# Patient Record
Sex: FEMALE | Race: WHITE | NOT HISPANIC OR LATINO | Employment: OTHER | URBAN - METROPOLITAN AREA
[De-identification: names, ages, dates, MRNs, and addresses within clinical notes are randomized per-mention and may not be internally consistent; named-entity substitution may affect disease eponyms.]

---

## 2018-10-25 ENCOUNTER — HOSPITAL ENCOUNTER (INPATIENT)
Facility: HOSPITAL | Age: 68
LOS: 1 days | Discharge: HOME | DRG: 386 | End: 2018-10-28
Attending: EMERGENCY MEDICINE | Admitting: FAMILY MEDICINE
Payer: MEDICARE

## 2018-10-25 ENCOUNTER — APPOINTMENT (EMERGENCY)
Dept: RADIOLOGY | Facility: HOSPITAL | Age: 68
DRG: 386 | End: 2018-10-25
Payer: MEDICARE

## 2018-10-25 DIAGNOSIS — K50.10: ICD-10-CM

## 2018-10-25 DIAGNOSIS — K52.9 COLITIS: Primary | ICD-10-CM

## 2018-10-25 PROBLEM — K57.92 DIVERTICULITIS: Status: ACTIVE | Noted: 2018-10-25

## 2018-10-25 LAB
ALBUMIN SERPL BCP-MCNC: 3.4 G/DL (ref 3.5–5)
ALP SERPL-CCNC: 70 U/L (ref 46–116)
ALT SERPL W P-5'-P-CCNC: 35 U/L (ref 12–78)
ANION GAP SERPL CALCULATED.3IONS-SCNC: 8 MMOL/L (ref 4–13)
AST SERPL W P-5'-P-CCNC: 19 U/L (ref 5–45)
BACTERIA UR QL AUTO: ABNORMAL /HPF
BASOPHILS # BLD AUTO: 0.03 THOUSANDS/ΜL (ref 0–0.1)
BASOPHILS NFR BLD AUTO: 0 % (ref 0–1)
BILIRUB DIRECT SERPL-MCNC: 0.2 MG/DL (ref 0–0.2)
BILIRUB SERPL-MCNC: 0.5 MG/DL (ref 0.2–1)
BILIRUB UR QL STRIP: NEGATIVE
BUN SERPL-MCNC: 14 MG/DL (ref 5–25)
CALCIUM SERPL-MCNC: 9.5 MG/DL (ref 8.3–10.1)
CHLORIDE SERPL-SCNC: 98 MMOL/L (ref 100–108)
CLARITY UR: CLEAR
CO2 SERPL-SCNC: 29 MMOL/L (ref 21–32)
COLOR UR: YELLOW
CREAT SERPL-MCNC: 0.81 MG/DL (ref 0.6–1.3)
EOSINOPHIL # BLD AUTO: 0.08 THOUSAND/ΜL (ref 0–0.61)
EOSINOPHIL NFR BLD AUTO: 1 % (ref 0–6)
ERYTHROCYTE [DISTWIDTH] IN BLOOD BY AUTOMATED COUNT: 14.6 % (ref 11.6–15.1)
GFR SERPL CREATININE-BSD FRML MDRD: 76 ML/MIN/1.73SQ M
GLUCOSE SERPL-MCNC: 103 MG/DL (ref 65–140)
GLUCOSE UR STRIP-MCNC: NEGATIVE MG/DL
HCT VFR BLD AUTO: 42.2 % (ref 34.8–46.1)
HGB BLD-MCNC: 13.7 G/DL (ref 11.5–15.4)
HGB UR QL STRIP.AUTO: NEGATIVE
IMM GRANULOCYTES # BLD AUTO: 0.08 THOUSAND/UL (ref 0–0.2)
IMM GRANULOCYTES NFR BLD AUTO: 1 % (ref 0–2)
KETONES UR STRIP-MCNC: NEGATIVE MG/DL
LEUKOCYTE ESTERASE UR QL STRIP: NEGATIVE
LIPASE SERPL-CCNC: 146 U/L (ref 73–393)
LYMPHOCYTES # BLD AUTO: 1.38 THOUSANDS/ΜL (ref 0.6–4.47)
LYMPHOCYTES NFR BLD AUTO: 10 % (ref 14–44)
MAGNESIUM SERPL-MCNC: 1.9 MG/DL (ref 1.6–2.6)
MCH RBC QN AUTO: 28.7 PG (ref 26.8–34.3)
MCHC RBC AUTO-ENTMCNC: 32.5 G/DL (ref 31.4–37.4)
MCV RBC AUTO: 89 FL (ref 82–98)
MONOCYTES # BLD AUTO: 0.86 THOUSAND/ΜL (ref 0.17–1.22)
MONOCYTES NFR BLD AUTO: 6 % (ref 4–12)
MUCOUS THREADS UR QL AUTO: ABNORMAL
NEUTROPHILS # BLD AUTO: 11.87 THOUSANDS/ΜL (ref 1.85–7.62)
NEUTS SEG NFR BLD AUTO: 82 % (ref 43–75)
NITRITE UR QL STRIP: POSITIVE
NON-SQ EPI CELLS URNS QL MICRO: ABNORMAL /HPF
NRBC BLD AUTO-RTO: 0 /100 WBCS
PH UR STRIP.AUTO: 6 [PH] (ref 5–9)
PLATELET # BLD AUTO: 277 THOUSANDS/UL (ref 149–390)
PMV BLD AUTO: 8.2 FL (ref 8.9–12.7)
POTASSIUM SERPL-SCNC: 3.5 MMOL/L (ref 3.5–5.3)
PROT SERPL-MCNC: 7.3 G/DL (ref 6.4–8.2)
PROT UR STRIP-MCNC: NEGATIVE MG/DL
RBC # BLD AUTO: 4.77 MILLION/UL (ref 3.81–5.12)
RBC #/AREA URNS AUTO: ABNORMAL /HPF
SODIUM SERPL-SCNC: 135 MMOL/L (ref 136–145)
SP GR UR STRIP.AUTO: <=1.005 (ref 1–1.03)
UROBILINOGEN UR QL STRIP.AUTO: 0.2 E.U./DL
WBC # BLD AUTO: 14.3 THOUSAND/UL (ref 4.31–10.16)
WBC #/AREA URNS AUTO: ABNORMAL /HPF

## 2018-10-25 PROCEDURE — 74177 CT ABD & PELVIS W/CONTRAST: CPT

## 2018-10-25 PROCEDURE — 87081 CULTURE SCREEN ONLY: CPT | Performed by: FAMILY MEDICINE

## 2018-10-25 PROCEDURE — 80076 HEPATIC FUNCTION PANEL: CPT | Performed by: EMERGENCY MEDICINE

## 2018-10-25 PROCEDURE — 36415 COLL VENOUS BLD VENIPUNCTURE: CPT | Performed by: EMERGENCY MEDICINE

## 2018-10-25 PROCEDURE — 83690 ASSAY OF LIPASE: CPT | Performed by: EMERGENCY MEDICINE

## 2018-10-25 PROCEDURE — 96375 TX/PRO/DX INJ NEW DRUG ADDON: CPT

## 2018-10-25 PROCEDURE — 96374 THER/PROPH/DIAG INJ IV PUSH: CPT

## 2018-10-25 PROCEDURE — 96361 HYDRATE IV INFUSION ADD-ON: CPT

## 2018-10-25 PROCEDURE — 99285 EMERGENCY DEPT VISIT HI MDM: CPT

## 2018-10-25 PROCEDURE — 85025 COMPLETE CBC W/AUTO DIFF WBC: CPT | Performed by: EMERGENCY MEDICINE

## 2018-10-25 PROCEDURE — 80048 BASIC METABOLIC PNL TOTAL CA: CPT | Performed by: EMERGENCY MEDICINE

## 2018-10-25 PROCEDURE — 81001 URINALYSIS AUTO W/SCOPE: CPT | Performed by: EMERGENCY MEDICINE

## 2018-10-25 PROCEDURE — 83735 ASSAY OF MAGNESIUM: CPT | Performed by: EMERGENCY MEDICINE

## 2018-10-25 PROCEDURE — 99219 PR INITIAL OBSERVATION CARE/DAY 50 MINUTES: CPT | Performed by: NURSE PRACTITIONER

## 2018-10-25 RX ORDER — HYDROMORPHONE HCL/PF 1 MG/ML
1 SYRINGE (ML) INJECTION ONCE
Status: COMPLETED | OUTPATIENT
Start: 2018-10-25 | End: 2018-10-25

## 2018-10-25 RX ORDER — METRONIDAZOLE 500 MG/1
500 TABLET ORAL EVERY 8 HOURS SCHEDULED
Qty: 30 TABLET | Refills: 0 | Status: SHIPPED | OUTPATIENT
Start: 2018-10-25 | End: 2018-10-28

## 2018-10-25 RX ORDER — CIPROFLOXACIN 500 MG/1
500 TABLET, FILM COATED ORAL 2 TIMES DAILY
Qty: 20 TABLET | Refills: 0 | Status: SHIPPED | OUTPATIENT
Start: 2018-10-25 | End: 2018-10-28 | Stop reason: HOSPADM

## 2018-10-25 RX ORDER — ONDANSETRON 2 MG/ML
4 INJECTION INTRAMUSCULAR; INTRAVENOUS ONCE
Status: DISCONTINUED | OUTPATIENT
Start: 2018-10-25 | End: 2018-10-25

## 2018-10-25 RX ORDER — ALENDRONATE SODIUM 5 MG
5 TABLET ORAL
COMMUNITY

## 2018-10-25 RX ORDER — BISOPROLOL FUMARATE 5 MG/1
10 TABLET ORAL DAILY
Status: DISCONTINUED | OUTPATIENT
Start: 2018-10-26 | End: 2018-10-28 | Stop reason: HOSPADM

## 2018-10-25 RX ORDER — BISOPROLOL FUMARATE AND HYDROCHLOROTHIAZIDE 10; 6.25 MG/1; MG/1
2 TABLET ORAL DAILY
COMMUNITY
End: 2018-10-25

## 2018-10-25 RX ORDER — LEVOFLOXACIN 5 MG/ML
750 INJECTION, SOLUTION INTRAVENOUS EVERY 24 HOURS
Status: DISCONTINUED | OUTPATIENT
Start: 2018-10-26 | End: 2018-10-28 | Stop reason: HOSPADM

## 2018-10-25 RX ORDER — ONDANSETRON 2 MG/ML
4 INJECTION INTRAMUSCULAR; INTRAVENOUS ONCE
Status: COMPLETED | OUTPATIENT
Start: 2018-10-25 | End: 2018-10-25

## 2018-10-25 RX ORDER — MORPHINE SULFATE 4 MG/ML
4 INJECTION, SOLUTION INTRAMUSCULAR; INTRAVENOUS ONCE
Status: COMPLETED | OUTPATIENT
Start: 2018-10-25 | End: 2018-10-25

## 2018-10-25 RX ORDER — BISOPROLOL FUMARATE AND HYDROCHLOROTHIAZIDE 5; 6.25 MG/1; MG/1
2 TABLET ORAL DAILY
COMMUNITY

## 2018-10-25 RX ORDER — LANOLIN ALCOHOL/MO/W.PET/CERES
3 CREAM (GRAM) TOPICAL
Status: DISCONTINUED | OUTPATIENT
Start: 2018-10-25 | End: 2018-10-28 | Stop reason: HOSPADM

## 2018-10-25 RX ORDER — CIPROFLOXACIN 500 MG/1
500 TABLET, FILM COATED ORAL ONCE
Status: COMPLETED | OUTPATIENT
Start: 2018-10-25 | End: 2018-10-25

## 2018-10-25 RX ORDER — SODIUM CHLORIDE 9 MG/ML
75 INJECTION, SOLUTION INTRAVENOUS CONTINUOUS
Status: DISCONTINUED | OUTPATIENT
Start: 2018-10-25 | End: 2018-10-28 | Stop reason: HOSPADM

## 2018-10-25 RX ORDER — ONDANSETRON 2 MG/ML
INJECTION INTRAMUSCULAR; INTRAVENOUS
Status: DISPENSED
Start: 2018-10-25 | End: 2018-10-26

## 2018-10-25 RX ORDER — METRONIDAZOLE 500 MG/1
500 TABLET ORAL ONCE
Status: COMPLETED | OUTPATIENT
Start: 2018-10-25 | End: 2018-10-25

## 2018-10-25 RX ADMIN — ONDANSETRON 4 MG: 2 INJECTION INTRAMUSCULAR; INTRAVENOUS at 19:59

## 2018-10-25 RX ADMIN — MORPHINE SULFATE 4 MG: 4 INJECTION INTRAVENOUS at 17:06

## 2018-10-25 RX ADMIN — IOHEXOL 100 ML: 350 INJECTION, SOLUTION INTRAVENOUS at 17:45

## 2018-10-25 RX ADMIN — SODIUM CHLORIDE 1000 ML: 0.9 INJECTION, SOLUTION INTRAVENOUS at 16:41

## 2018-10-25 RX ADMIN — MELATONIN TAB 3 MG 3 MG: 3 TAB at 21:29

## 2018-10-25 RX ADMIN — CIPROFLOXACIN HYDROCHLORIDE 500 MG: 500 TABLET, FILM COATED ORAL at 18:51

## 2018-10-25 RX ADMIN — SODIUM CHLORIDE 75 ML/HR: 0.9 INJECTION, SOLUTION INTRAVENOUS at 21:18

## 2018-10-25 RX ADMIN — METRONIDAZOLE 500 MG: 500 TABLET ORAL at 18:51

## 2018-10-25 RX ADMIN — HYDROMORPHONE HYDROCHLORIDE 1 MG: 1 INJECTION, SOLUTION INTRAMUSCULAR; INTRAVENOUS; SUBCUTANEOUS at 18:52

## 2018-10-25 NOTE — ED NOTES
Pt returned from CT  Pt offers no complaints  No distress noted  Pt resting comfortably  [facility] at bedside       Callie Naik RN  10/25/18 5773

## 2018-10-25 NOTE — ED PROVIDER NOTES
History  Chief Complaint   Patient presents with    Abdominal Pain     c/o abd pain since last night with nausea and chills     80 y/o female presents with lower abdominal pain that started a day ago getting progressively worse  Denies nausea,vomiting,diarrhea,constipation,fevers  admits to chills  Denies dysuria, urgency or frequency  History of diverticulitis  Reports the pain is sharp continuous currently 8/10 and nothing makes it better or worse  History provided by:  Patient   used: No        Prior to Admission Medications   Prescriptions Last Dose Informant Patient Reported? Taking? Multiple Vitamins-Minerals (MULTIVITAMIN ADULT PO) 10/24/2018 at Unknown time  Yes Yes   Sig: Take by mouth daily   alendronate (FOSAMAX) 5 mg tablet 10/24/2018 at Unknown time  Yes Yes   Sig: Take 5 mg by mouth weekly before breakfast   bisoprolol-hydrochlorothiazide (ZIAC) 5-6 25 MG per tablet 10/25/2018 at Unknown time  Yes Yes   Sig: Take 2 tablets by mouth daily      Facility-Administered Medications: None       Past Medical History:   Diagnosis Date    Diverticulitis     Hypertension     Osteoporosis        Past Surgical History:   Procedure Laterality Date    TONSILLECTOMY         Family History   Problem Relation Age of Onset    Heart attack Mother     Heart failure Mother     Stroke Father     Hypertension Father     Alzheimer's disease Father     Osteoporosis Sister     Heart attack Brother     Arthritis Brother     Aortic aneurysm Brother      I have reviewed and agree with the history as documented  Social History   Substance Use Topics    Smoking status: Never Smoker    Smokeless tobacco: Never Used    Alcohol use Yes      Comment: one glass of wine most nights        Review of Systems   All other systems reviewed and are negative  Physical Exam  Physical Exam   Constitutional: She is oriented to person, place, and time   She appears well-developed and well-nourished  HENT:   Head: Normocephalic and atraumatic  Eyes: Pupils are equal, round, and reactive to light  EOM are normal    Neck: Normal range of motion  Neck supple  Cardiovascular: Normal rate and regular rhythm  Pulmonary/Chest: Effort normal and breath sounds normal    Abdominal: Soft  Bowel sounds are normal  She exhibits no distension and no mass  There is tenderness  There is no rebound and no guarding  No hernia  Musculoskeletal: Normal range of motion  Neurological: She is alert and oriented to person, place, and time  Skin: Skin is warm and dry  Psychiatric: She has a normal mood and affect  Nursing note and vitals reviewed        Vital Signs  ED Triage Vitals [10/25/18 1603]   Temperature Pulse Respirations Blood Pressure SpO2   98 7 °F (37 1 °C) 68 16 128/60 99 %      Temp Source Heart Rate Source Patient Position - Orthostatic VS BP Location FiO2 (%)   Tympanic Monitor Sitting Right arm --      Pain Score       9           Vitals:    10/25/18 1603 10/25/18 1811 10/25/18 2024 10/25/18 2125   BP: 128/60 134/61 129/60 143/65   Pulse: 68 84 88 85   Patient Position - Orthostatic VS: Sitting Lying Lying Lying       Visual Acuity      ED Medications  Medications   multivitamin-minerals (CENTRUM) tablet 1 tablet (not administered)   bisoprolol (ZEBETA) tablet 10 mg (not administered)   sodium chloride 0 9 % infusion (75 mL/hr Intravenous New Bag 10/25/18 2118)   levofloxacin (LEVAQUIN) IVPB (premix) 750 mg (not administered)     And   metroNIDAZOLE (FLAGYL) IVPB (premix) 500 mg (not administered)   enoxaparin (LOVENOX) subcutaneous injection 40 mg (not administered)   melatonin tablet 3 mg (3 mg Oral Given 10/25/18 2129)   sodium chloride 0 9 % bolus 1,000 mL (0 mL Intravenous Stopped 10/25/18 1854)   morphine (PF) 4 mg/mL injection 4 mg (4 mg Intravenous Given 10/25/18 1706)   iohexol (OMNIPAQUE) 350 MG/ML injection (MULTI-DOSE) 100 mL (100 mL Intravenous Given 10/25/18 1745) ciprofloxacin (CIPRO) tablet 500 mg (500 mg Oral Given 10/25/18 1851)   metroNIDAZOLE (FLAGYL) tablet 500 mg (500 mg Oral Given 10/25/18 1851)   HYDROmorphone (DILAUDID) injection 1 mg (1 mg Intravenous Given 10/25/18 1852)   ondansetron (ZOFRAN) injection 4 mg (4 mg Intravenous Given 10/25/18 1959)       Diagnostic Studies  Results Reviewed     Procedure Component Value Units Date/Time    Urine Microscopic [[id number]]  (Abnormal) Collected:  10/25/18 1825    Lab Status:  Final result Specimen:  Urine from Urine, Clean Catch Updated:  10/25/18 1845     RBC, UA None Seen /hpf      WBC, UA None Seen /hpf      Epithelial Cells Occasional /hpf      Bacteria, UA Occasional /hpf      MUCOUS THREADS Occasional (A)    UA w Reflex to Microscopic [[id number]]  (Abnormal) Collected:  10/25/18 1825    Lab Status:  Final result Specimen:  Urine from Urine, Clean Catch Updated:  10/25/18 1834     Color, UA Yellow     Clarity, UA Clear     Specific Gravity, UA <=1 005     pH, UA 6 0     Leukocytes, UA Negative     Nitrite, UA Positive (A)     Protein, UA Negative mg/dl      Glucose, UA Negative mg/dl      Ketones, UA Negative mg/dl      Urobilinogen, UA 0 2 E U /dl      Bilirubin, UA Negative     Blood, UA Negative    Basic metabolic panel [[id number]]  (Abnormal) Collected:  10/25/18 1641    Lab Status:  Final result Specimen:  Blood from Arm, Left Updated:  10/25/18 1701     Sodium 135 (L) mmol/L      Potassium 3 5 mmol/L      Chloride 98 (L) mmol/L      CO2 29 mmol/L      ANION GAP 8 mmol/L      BUN 14 mg/dL      Creatinine 0 81 mg/dL      Glucose 103 mg/dL      Calcium 9 5 mg/dL      eGFR 76 ml/min/1 73sq m     Narrative:         National Kidney Disease Education Program recommendations are as follows:  GFR calculation is accurate only with a steady state creatinine  Chronic Kidney disease less than 60 ml/min/1 73 sq  meters  Kidney failure less than 15 ml/min/1 73 sq  meters      Hepatic function panel [[id number]]  (Abnormal) Collected:  10/25/18 1641    Lab Status:  Final result Specimen:  Blood from Arm, Left Updated:  10/25/18 1701     Total Bilirubin 0 50 mg/dL      Bilirubin, Direct 0 20 mg/dL      Alkaline Phosphatase 70 U/L      AST 19 U/L      ALT 35 U/L      Total Protein 7 3 g/dL      Albumin 3 4 (L) g/dL     Magnesium [[id number]]  (Normal) Collected:  10/25/18 1641    Lab Status:  Final result Specimen:  Blood from Arm, Left Updated:  10/25/18 1701     Magnesium 1 9 mg/dL     Lipase [[id number]]  (Normal) Collected:  10/25/18 1641    Lab Status:  Final result Specimen:  Blood from Arm, Left Updated:  10/25/18 1701     Lipase 146 u/L     CBC and differential [[id number]]  (Abnormal) Collected:  10/25/18 1641    Lab Status:  Final result Specimen:  Blood from Arm, Left Updated:  10/25/18 1646     WBC 14 30 (H) Thousand/uL      RBC 4 77 Million/uL      Hemoglobin 13 7 g/dL      Hematocrit 42 2 %      MCV 89 fL      MCH 28 7 pg      MCHC 32 5 g/dL      RDW 14 6 %      MPV 8 2 (L) fL      Platelets 600 Thousands/uL      nRBC 0 /100 WBCs      Neutrophils Relative 82 (H) %      Immat GRANS % 1 %      Lymphocytes Relative 10 (L) %      Monocytes Relative 6 %      Eosinophils Relative 1 %      Basophils Relative 0 %      Neutrophils Absolute 11 87 (H) Thousands/µL      Immature Grans Absolute 0 08 Thousand/uL      Lymphocytes Absolute 1 38 Thousands/µL      Monocytes Absolute 0 86 Thousand/µL      Eosinophils Absolute 0 08 Thousand/µL      Basophils Absolute 0 03 Thousands/µL                  CT abdomen pelvis with contrast   Final Result by Randal Alfonso MD (10/25 1749)      Long segment thickening of the sigmoid colon in keeping with a nonspecific segmental colitis, likely infectious or inflammatory  This does not have the typical appearance for diverticulitis given the long segment of involvement  No pericolonic abscess  No bowel obstruction  The study was marked in [facility] for immediate notification        Workstation performed: [id number]                    Procedures  Procedures       Phone Contacts  ED Phone Contact    ED Course                               MDM  Number of Diagnoses or Management Options  Colitis:   Diagnosis management comments: Patient evaluated with labs, imaging, UA  Reviewed results discussed with patient  Patient given IV fluids, pain, nausea medications, and antibiotics  Patient admitted to hospitalist service for further evaluation and management  Patient verbalized understanding of results and agreed with the plan         Amount and/or Complexity of Data Reviewed  Clinical lab tests: ordered and reviewed  Tests in the radiology section of CPT®: ordered and reviewed  Tests in the medicine section of CPT®: ordered and reviewed    Patient Progress  Patient progress: stable    CritCare Time    Disposition  Final diagnoses:   Colitis     Time reflects when diagnosis was documented in both MDM as applicable and the Disposition within this note     Time User Action Codes Description Comment    10/25/2018  7:50 PM Aba Jay Add [K52 9] Colitis       ED Disposition     ED Disposition Condition Comment    Admit        Follow-up Information     Follow up With Specialties Details Why Contact Info Additional [address] Emergency Department Emergency Medicine  If symptoms worsen [address] [facility]  [phone] [facility], [place], [place], [address]          Current Discharge Medication List      START taking these medications    Details   ciprofloxacin (CIPRO) 500 mg tablet Take 1 tablet (500 mg total) by mouth 2 (two) times a day for 10 days  Qty: 20 tablet, Refills: 0    Associated Diagnoses: Colitis      metroNIDAZOLE (FLAGYL) 500 mg tablet Take 1 tablet (500 mg total) by mouth every 8 (eight) hours for 10 days  Qty: 30 tablet, Refills: 0    Associated Diagnoses: Colitis         CONTINUE these medications which have NOT CHANGED Details   alendronate (FOSAMAX) 5 mg tablet Take 5 mg by mouth weekly before breakfast      bisoprolol-hydrochlorothiazide (ZIAC) 5-6 25 MG per tablet Take 2 tablets by mouth daily      Multiple Vitamins-Minerals (MULTIVITAMIN ADULT PO) Take by mouth daily           No discharge procedures on file      ED Provider  Electronically Signed by           Marilee Reynoso DO  10/25/18 6447

## 2018-10-25 NOTE — ED NOTES
Pt reports having lower 2 quad abdominal pain since last night  Pt reprots history of diverticulitis  Pt reports feeling distended  Pt denies any constipation or diarrhea  No distress noted  Pt reports pain to lower 2 quad as 9/10  Pt request pqain meds for pain    Will convey to MD Kenny Llamas RN  10/25/18 8391

## 2018-10-26 PROBLEM — E87.1 HYPONATREMIA: Status: ACTIVE | Noted: 2018-10-26

## 2018-10-26 PROBLEM — D72.829 LEUKOCYTOSIS: Status: ACTIVE | Noted: 2018-10-26

## 2018-10-26 LAB
ANION GAP SERPL CALCULATED.3IONS-SCNC: 8 MMOL/L (ref 4–13)
BUN SERPL-MCNC: 12 MG/DL (ref 5–25)
CALCIUM SERPL-MCNC: 8.9 MG/DL (ref 8.3–10.1)
CHLORIDE SERPL-SCNC: 100 MMOL/L (ref 100–108)
CO2 SERPL-SCNC: 26 MMOL/L (ref 21–32)
CREAT SERPL-MCNC: 0.8 MG/DL (ref 0.6–1.3)
ERYTHROCYTE [DISTWIDTH] IN BLOOD BY AUTOMATED COUNT: 14.9 % (ref 11.6–15.1)
GFR SERPL CREATININE-BSD FRML MDRD: 77 ML/MIN/1.73SQ M
GLUCOSE P FAST SERPL-MCNC: 109 MG/DL (ref 65–99)
GLUCOSE SERPL-MCNC: 109 MG/DL (ref 65–140)
HCT VFR BLD AUTO: 40 % (ref 34.8–46.1)
HGB BLD-MCNC: 12.7 G/DL (ref 11.5–15.4)
MCH RBC QN AUTO: 28.5 PG (ref 26.8–34.3)
MCHC RBC AUTO-ENTMCNC: 31.8 G/DL (ref 31.4–37.4)
MCV RBC AUTO: 90 FL (ref 82–98)
PLATELET # BLD AUTO: 286 THOUSANDS/UL (ref 149–390)
PMV BLD AUTO: 8.5 FL (ref 8.9–12.7)
POTASSIUM SERPL-SCNC: 3.6 MMOL/L (ref 3.5–5.3)
RBC # BLD AUTO: 4.46 MILLION/UL (ref 3.81–5.12)
SODIUM SERPL-SCNC: 134 MMOL/L (ref 136–145)
WBC # BLD AUTO: 20.94 THOUSAND/UL (ref 4.31–10.16)

## 2018-10-26 PROCEDURE — 80048 BASIC METABOLIC PNL TOTAL CA: CPT | Performed by: NURSE PRACTITIONER

## 2018-10-26 PROCEDURE — 99225 PR SBSQ OBSERVATION CARE/DAY 25 MINUTES: CPT | Performed by: FAMILY MEDICINE

## 2018-10-26 PROCEDURE — 85027 COMPLETE CBC AUTOMATED: CPT | Performed by: NURSE PRACTITIONER

## 2018-10-26 RX ORDER — POTASSIUM CHLORIDE 20 MEQ/1
40 TABLET, EXTENDED RELEASE ORAL ONCE
Status: COMPLETED | OUTPATIENT
Start: 2018-10-26 | End: 2018-10-26

## 2018-10-26 RX ORDER — ACETAMINOPHEN 325 MG/1
650 TABLET ORAL EVERY 6 HOURS PRN
Status: DISCONTINUED | OUTPATIENT
Start: 2018-10-26 | End: 2018-10-28 | Stop reason: HOSPADM

## 2018-10-26 RX ADMIN — METRONIDAZOLE 500 MG: 500 INJECTION, SOLUTION INTRAVENOUS at 13:00

## 2018-10-26 RX ADMIN — MORPHINE SULFATE 2 MG: 2 INJECTION, SOLUTION INTRAMUSCULAR; INTRAVENOUS at 01:15

## 2018-10-26 RX ADMIN — LEVOFLOXACIN 750 MG: 5 INJECTION, SOLUTION INTRAVENOUS at 19:48

## 2018-10-26 RX ADMIN — POTASSIUM CHLORIDE 40 MEQ: 1500 TABLET, EXTENDED RELEASE ORAL at 18:16

## 2018-10-26 RX ADMIN — MORPHINE SULFATE 2 MG: 2 INJECTION, SOLUTION INTRAMUSCULAR; INTRAVENOUS at 22:32

## 2018-10-26 RX ADMIN — MELATONIN TAB 3 MG 3 MG: 3 TAB at 21:10

## 2018-10-26 RX ADMIN — MORPHINE SULFATE 2 MG: 2 INJECTION, SOLUTION INTRAMUSCULAR; INTRAVENOUS at 18:13

## 2018-10-26 RX ADMIN — BISOPROLOL FUMARATE 10 MG: 5 TABLET ORAL at 10:59

## 2018-10-26 RX ADMIN — MORPHINE SULFATE 2 MG: 2 INJECTION, SOLUTION INTRAMUSCULAR; INTRAVENOUS at 14:48

## 2018-10-26 RX ADMIN — METRONIDAZOLE 500 MG: 500 INJECTION, SOLUTION INTRAVENOUS at 04:55

## 2018-10-26 RX ADMIN — ACETAMINOPHEN 650 MG: 325 TABLET, FILM COATED ORAL at 21:07

## 2018-10-26 RX ADMIN — MORPHINE SULFATE 2 MG: 2 INJECTION, SOLUTION INTRAMUSCULAR; INTRAVENOUS at 09:50

## 2018-10-26 RX ADMIN — ENOXAPARIN SODIUM 40 MG: 40 INJECTION SUBCUTANEOUS at 09:55

## 2018-10-26 NOTE — UTILIZATION REVIEW
Notification of Inpatient Admission/Inpatient Authorization Request  This is a Notification of Inpatient Admission/Request for Inpatient Authorization for our facility [address] [place] [address]  Please be advised that this patient is currently in our facility under Inpatient Status  Below you will find the Attending Physician and Facilitys information including NPI#  and contact information for the Utilization Review Department where the patient is receiving care services  Place of Service Code: 24   Place of Service Name: Inpatient Hospital  Presentation Date & Time: 10/25/2018  4:08 PM  Inpatient Admission Date & Time: N/A N/A  Discharge Date & Time: No discharge date for patient encounter  Discharge Disposition (if discharged): Final discharge disposition not confirmed  Attending Physician: SARAH Combs  Specialty- Hospitalist,   [facility] ID- [id number]  [address] [place] [address]  Phone 1: [phone]  Fax: [phone]  Admission Orders:   Admission Orders     Ordered        10/25/18 2008  Place in Observation (expected length of stay for this patient is less than two midnights)  Once               Facility: [address] [place] [address]  Address: [address] [place] [address]  Phone: [phone]  Tax ID: [id number]  NPI: [id number]  Medicare ID: [id number]    Thank you,  [address] [place] [address] Utilization Review Department  Phone: [phone]; Fax [phone]  ATTENTION: Please call with any questions or concerns to [phone]  and carefully follow the prompts so that you are directed to the right person  Send all requests for admission clinical reviews, approved or denied determinations and any other requests to fax [phone]   All voicemails are confidential   Olena Otero RN Registered Nurse Signed   Utilization Review Date of Service: 10/26/2018  7:26 AM         []Hide copied text  Initial Clinical Review     Admission: Date/Time/Statement: 10/25/18 2009           Orders Placed This Encounter   Procedures    Place in Observation (expected length of stay for this patient is less than two midnights)       Standing Status:   Standing       Number of Occurrences:   1       Order Specific Question:   Admitting Physician       Answer:   Jason Wiggins [[id number]]       Order Specific Question:   Level of Care       Answer:   Med Surg [16]            ED: Date/Time/Mode of Arrival:             ED Arrival Information      Expected Arrival Acuity Means of Arrival Escorted By Service Admission Type     - 10/25/2018 15:46 Urgent [facility]In [facility] General Medicine Urgent     Arrival Complaint     SEVERE ABDOMINAL PAIN             Chief Complaint:        Chief Complaint   Patient presents with    Abdominal Pain       c/o abd pain since last night with nausea and chills         History of Illness: Yanelis Mckinley a 79 y o  female with a PMH of osteoarthritis, hypertension, diverticulitis who presents with lower abdominal pain   Patient was hospitalized several weeks ago in [place] for diverticulitis with abscess   She was treated with antibiotics and then had an outpatient colonoscopy about 10 days ago which showed resolution of the abscess   Several polyps were taken for biopsy  Linda Alvarez is appear visiting family and developed lower abdominal pain yesterday  Linda Alvarez has reported some intermittent constipation   In the ER she is found to have leukocytosis with a white count of 14 6   CT of the abdomen and pelvis revealed segmental colitis   Patient was given Dilaudid for pain medication and subsequently started vomiting   She is admitted for observation over night   She reports some chills but no fever   She denies any diarrhea   She did feel nauseous prior to her arrival in the emergency department         ED Vital Signs:            ED Triage Vitals [10/25/18 1603]   Temperature Pulse Respirations Blood Pressure SpO2   98 7 °F (37 1 °C) 68 16 128/60 99 %       Temp Source Heart Rate Source Patient Position - Orthostatic VS BP Location FiO2 (%)   Tympanic Monitor Sitting Right arm --       Pain Score           9                Wt Readings from Last 1 Encounters:   10/25/18 69 2 kg (152 lb 8 9 oz)         Abnormal Labs/Diagnostic Test Results: na 134 glucose 109 wbc 20 94, ua nitrite positive  Ct abdomen pelvis Long segment thickening of the sigmoid colon in keeping with a nonspecific segmental colitis, likely infectious or inflammatory   This does not have the typical appearance for diverticulitis given the long segment of involvement  No pericolonic abscess   No bowel obstruction  ED Treatment:              Medication Administration from 10/25/2018 1546 to 10/25/2018 2046        Date/Time Order Dose Route Action Action by Comments       10/25/2018 1854 sodium chloride 0 9 % bolus 1,000 mL 0 mL Intravenous Stopped Abiodun Ch RN         10/25/2018 1641 sodium chloride 0 9 % bolus 1,000 mL 1,000 mL Intravenous New 1555 [facility] Shar London RN         10/25/2018 1706 morphine (PF) 4 mg/mL injection 4 mg 4 mg Intravenous Given Abiodun Ch RN         10/25/2018 1745 iohexol (OMNIPAQUE) 350 MG/ML injection (MULTI-DOSE) 100 mL 100 mL Intravenous Given Althea Silence         10/25/2018 1851 ciprofloxacin (CIPRO) tablet 500 mg 500 mg Oral Given Abiodun Ch RN         10/25/2018 1851 metroNIDAZOLE (FLAGYL) tablet 500 mg 500 mg Oral Given Abiodun Ch RN         10/25/2018 1852 HYDROmorphone (DILAUDID) injection 1 mg 1 mg Intravenous Given Abiodun Ch RN         10/25/2018 1959 ondansetron (ZOFRAN) injection 4 mg 4 mg Intravenous Given Flor Negron RN               Past Medical/Surgical History:         Active Ambulatory Problems     Diagnosis Date Noted    No Active Ambulatory Problems           Resolved Ambulatory Problems     Diagnosis Date Noted    No Resolved Ambulatory Problems           Past Medical History: Diagnosis Date    Diverticulitis      Hypertension      Osteoporosis           Admitting Diagnosis: Colitis [K52 9]  Abdominal pain [R10 9]     Age/Sex: 79 y o  female     Assessment/Plan:       Segmental colitis ([address] )   Assessment & Plan     · Recently hospitalized for diverticulitis with abscess in [place]; followed up with GI as an outpatient and had a colonoscopy as an outpatient about 10 days ago  · Last night patient developed lower abdominal cramps - no  vomiting, diarrhea, fevers;  some constipation and chills  · CT of the abdomen and pelvis revealed long segment thickening of the sigmoid colon likely colitis  · Labs significant for a WBC 14 30  · Admit patient for further management given nausea and vomiting out after pain medication administration  · NPO for bowel rest, IV hydration, continue Cipro and Flagyl, antiemetics, pain medication  · Patient prefers to follow up with her gastroenterologist at home in [place], will hold off on consulting GI right now   Hypertension   Assessment & Plan     · Continue blood pressure medications   Osteoporosis   Assessment & Plan     · Continue Fosamax    VTE Prophylaxis: Enoxaparin (Lovenox)  / sequential compression device   Code Status: No Order  Anticipated Length of Stay: [facility] will be admitted on an Observation basis with an anticipated length of stay of  less than 2 midnights    Justification for Hospital Stay:  Segmental colitis     Admission Orders:  Observation  gmf  Npo      Scheduled Meds:   Current Facility-Administered Medications:  bisoprolol 10 mg Oral Daily Blanca Promise, CRNP     enoxaparin 40 mg Subcutaneous Daily Blanca Promise, CRNP     levofloxacin 750 mg Intravenous Q24H Blanca Promise, CRNP     And             metroNIDAZOLE 500 mg Intravenous Q8H Blanca Promise, CRNP Last Rate: 500 mg (10/26/18 0454)   melatonin 3 mg Oral HS PRN Blanca Promise, CRNP     morphine injection 2 mg Intravenous Q3H PRN Blanca Promise, CRNP   multivitamin-minerals 1 tablet Oral Daily Lolis Mas, CRNP     sodium chloride 75 mL/hr Intravenous Continuous Lolis Mas, CRNP Last Rate: 75 mL/hr (10/25/18 2118)      Continuous Infusions:   sodium chloride 75 mL/hr Last Rate: 75 mL/hr (10/25/18 2118)      PRN Meds: melatonin    morphine injection

## 2018-10-26 NOTE — ASSESSMENT & PLAN NOTE
See segmental colitis plan  Patient a febrile    · Will continue to monitor abdominal and worsening infection  · Trend WBC

## 2018-10-26 NOTE — PLAN OF CARE
DISCHARGE PLANNING     Discharge to home or other facility with appropriate resources Progressing        GASTROINTESTINAL - ADULT     Minimal or absence of nausea and/or vomiting Progressing        INFECTION - ADULT     Absence or prevention of progression during hospitalization Progressing        PAIN - ADULT     Verbalizes/displays adequate comfort level or baseline comfort level Progressing        Potential for Falls     Patient will remain free of falls Progressing        SAFETY ADULT     Patient will remain free of falls Progressing

## 2018-10-26 NOTE — SOCIAL WORK
SW met with pt to assess needs and discuss plans  Discussed goals of making sure pt's needs are met upon discharge, pt's preferences are taken into account, pt understands her health condition, medications and symptoms to watch for after returning home and pt is aware of any follow up appointments recommended by hospital physician  Pt lives in [place] with her significcant other  They are visiting friends/family  Pt is independent  No need for DME or HHC  Pt's has PCP and GI specialist in [place] who she will be following up with when she returns  Per pt her preferred pharmacy in [place] is a [facility]   Pt was told about the Meds to Bed program and would prefer to use that if possible when discharged  If that is not possible pt requested any prescriptions be sent to the [facility]  Per pt she has prescription coverage and no difficulty getting her medication as prescribed  Pt's plan is to return home with significant other when discharged  No discharge needs expressed or anticipated by pt at this time  Offered support in future if needed

## 2018-10-26 NOTE — PROGRESS NOTES
Progress Note - Justin Mosqueda 1950, 79 y o  female MRN: [id number]    Unit/Bed#: [address] [place] [address] Encounter: [id number]    Primary Care Provider: No primary care provider on file  Date and time admitted to hospital: 10/25/2018  4:08 PM        * Segmental colitis [facility])   Assessment & Plan    Recently hospitalized for diverticulitis with abscess in [place]; followed up with GI as an outpatient and had a colonoscopy as an outpatient about 10 days ago where polyps were removed  CT of the abdomen and pelvis revealed long segment thickening of the sigmoid colon likely colitis with significant leukocytosis  IV antibiotics started in the ED  · Continue Levaquin and Flagyl  · Continue NPO for bowel rest given abdominal pain 8/10 but will gradually starts on clears today  · Continue IVF, anti emetics, pain control  · Patient to fly back to [place] on Monday and hoping to follow up with her own GI  However, will consider GI consult specially worsening and not improving abdominal pain  · Continue monitoring abdominal exam     Leukocytosis   Assessment & Plan    See segmental colitis plan  Patient a febrile  · Will continue to monitor abdominal and worsening infection  · Trend WBC     Hyponatremia   Assessment & Plan    Mild hyponatremia noted 135>>134  Likely from episode of vomiting in the ED  Will continue to monitor sodium level and will do further test if clinically indicated  · Will trend sodium level for now and continue IV fluids       Hypertension   Assessment & Plan    Stable  Patient on Bisoprolol  · Continue     Osteoporosis   Assessment & Plan    Patient on Flomax  · Continue            VTE Pharmacologic Prophylaxis:   Pharmacologic: Enoxaparin (Lovenox)  Mechanical VTE Prophylaxis in Place: Yes    Patient Centered Rounds: I have performed bedside rounds with nursing staff today      Discussions with Specialists or Other Care Team Provider: Yes    Education and Discussions with Family / Patient:Yes  I have answered all questions to the best of my ability and knowledge  Time Spent for Care: 30 minutes  More than 50% of total time spent on counseling and coordination of care as described above  Current Length of Stay: 0 day(s)    Current Patient Status: Observation     Discharge Plan:  Go home to [place] and follow up with her GI doctor    Code Status: Level 1 - Full Code      Subjective: In bed comfortable, appears in no distress  Still complaining of diffuse abdominal pain 8/10 pain scale described it as cramps and "labor pains" however, she has not had any pain medications since it was given 1st time in the ED  She denies to nausea, vomiting, diarrhea, melena, hematochezia  She is always constipated but she had a normal BM yesterday  Objective:       Vitals:   Temp (24hrs), Av 9 °F (37 2 °C), Min:98 6 °F (37 °C), Max:99 2 °F (37 3 °C)    Temp:  [98 6 °F (37 °C)-99 2 °F (37 3 °C)] 98 6 °F (37 °C)  HR:  [68-88] 82  Resp:  [16-18] 18  BP: (125-143)/(57-65) 125/58  SpO2:  [90 %-99 %] 90 %  Body mass index is 27 02 kg/m²  Input and Output Summary (last 24 hours): Intake/Output Summary (Last 24 hours) at 10/26/18 1246  Last data filed at 10/25/18 1854   Gross per 24 hour   Intake              850 ml   Output                0 ml   Net              850 ml       Physical Exam:     Physical Exam   Constitutional: She is oriented to person, place, and time  She appears well-developed and well-nourished  HENT:   Head: Atraumatic  Neck: Normal range of motion  Neck supple  Cardiovascular: Normal rate, regular rhythm and normal heart sounds  No murmur heard  Pulmonary/Chest: Effort normal and breath sounds normal  No respiratory distress  She has no wheezes  She has no rales  Abdominal: Soft  Bowel sounds are normal  There is no rebound  Soft and tender on the RUQ, lower abdomen and mild on LLQ  Hypoactive bowel sounds x4 quads     Musculoskeletal: Normal range of motion  She exhibits no edema  Neurological: She is alert and oriented to person, place, and time  Skin: Skin is warm and dry  Psychiatric: She has a normal mood and affect  Her behavior is normal  Judgment and thought content normal        Additional Data:     Labs:      Results from last 7 days  Lab Units 10/26/18  0503 10/25/18  1641   WBC Thousand/uL 20 94* 14 30*   HEMOGLOBIN g/dL 12 7 13 7   HEMATOCRIT % 40 0 42 2   PLATELETS Thousands/uL 286 277   NEUTROS PCT %  --  82*   LYMPHS PCT %  --  10*   MONOS PCT %  --  6   EOS PCT %  --  1       Results from last 7 days  Lab Units 10/26/18  0503 10/25/18  1641   SODIUM mmol/L 134* 135*   POTASSIUM mmol/L 3 6 3 5   CHLORIDE mmol/L 100 98*   CO2 mmol/L 26 29   BUN mg/dL 12 14   CREATININE mg/dL 0 80 0 81   CALCIUM mg/dL 8 9 9 5   ALK PHOS U/L  --  70   ALT U/L  --  35   AST U/L  --  19           * I Have Reviewed All Lab Data Listed Above  * Additional Pertinent Lab Tests Reviewed: [address] Admission Reviewed    Imaging:  Ct Abdomen Pelvis With Contrast    Result Date: 10/25/2018  Narrative: CT ABDOMEN AND PELVIS WITH IV CONTRAST INDICATION:   abdominal pain  Pain with nausea and chills  COMPARISON:  None  TECHNIQUE:  CT examination of the abdomen and pelvis was performed  Axial, sagittal, and coronal 2D reformatted images were created from the source data and submitted for interpretation  Radiation dose length product (DLP) for this visit:  465 35 mGy-cm   This examination, like all CT scans performed in the [facility], was performed utilizing techniques to minimize radiation dose exposure, including the use of iterative  reconstruction and automated exposure control  IV Contrast:  Given; the type is not currently available  Enteric Contrast:  Enteric contrast was not administered  FINDINGS: Limited by motion  ABDOMEN LOWER CHEST:  Moderate to large sliding-type hiatal hernia  LIVER/BILIARY TREE:  Unremarkable  GALLBLADDER:  No calcified gallstones  No pericholecystic inflammatory change  SPLEEN:  Unremarkable  PANCREAS:  Unremarkable  ADRENAL GLANDS:  Unremarkable  KIDNEYS/URETERS:  Unremarkable  No hydronephrosis  STOMACH AND BOWEL:  Long segment thickening of the sigmoid colon in keeping with a nonspecific segmental colitis, likely infectious or inflammatory  This does not have the typical appearance for diverticulitis given the long segment  No bowel obstruction  Scattered colonic diverticuli  No bowel pneumatosis  APPENDIX:  Noninflamed  ABDOMINOPELVIC CAVITY:  No ascites or free intraperitoneal air  No lymphadenopathy  VESSELS:  Unremarkable for patient's age  PELVIS REPRODUCTIVE ORGANS:  Unremarkable for patient's age  URINARY BLADDER:  Small dot of air within the bladder without wall thickening likely secondary to recent catheterization  ABDOMINAL WALL/INGUINAL REGIONS:  Unremarkable  OSSEOUS STRUCTURES:  No acute fracture or destructive osseous lesion  Impression: Long segment thickening of the sigmoid colon in keeping with a nonspecific segmental colitis, likely infectious or inflammatory  This does not have the typical appearance for diverticulitis given the long segment of involvement  No pericolonic abscess  No bowel obstruction  The study was marked in [facility] for immediate notification   Workstation performed: [id number]     Imaging Reports Reviewed by myself    Cultures:   Blood Culture: No results found for: BLOODCX  Urine Culture: No results found for: URINECX  Sputum Culture: No components found for: SPUTUMCX  Wound Culture: No results found for: WOUNDCULT    Last 24 Hours Medication List:     Current Facility-Administered Medications:  bisoprolol 10 mg Oral Daily FRANTZ Moreno    enoxaparin 40 mg Subcutaneous Daily FRANTZ Moreno    levofloxacin 750 mg Intravenous Q24H FRANTZ Moreno    And        metroNIDAZOLE 500 mg Intravenous Q8H FRANTZ Moreno Last Rate: 500 mg (10/26/18 5767)   melatonin 3 mg Oral HS PRN Dossie Mattes, CRNP    morphine injection 2 mg Intravenous Q3H PRN Dossie Mattes, CRNP    multivitamin-minerals 1 tablet Oral Daily Dossie Mattes, CRNP    sodium chloride 75 mL/hr Intravenous Continuous Dossie Mattes, CRNP Last Rate: 75 mL/hr (10/25/18 9762)        Today, Patient Was Seen By: FRANTZ Marrero    ** Please Note: Dragon 360 Dictation voice to text software may have been used in the creation of this document   **

## 2018-10-26 NOTE — ED NOTES
Provider at bedside  Patient offered no complaints  Family at bedside       Callie Naik RN  10/25/18 2025

## 2018-10-26 NOTE — ASSESSMENT & PLAN NOTE
Recently hospitalized for diverticulitis with abscess in [place]; followed up with GI as an outpatient and had a colonoscopy as an outpatient about 10 days ago where polyps were removed  CT of the abdomen and pelvis revealed long segment thickening of the sigmoid colon likely colitis with significant leukocytosis  IV antibiotics started in the ED  · Continue Levaquin and Flagyl  · Continue NPO for bowel rest given abdominal pain 8/10 but will gradually starts on clears today  · Continue IVF, anti emetics, pain control  · Patient to fly back to [place] on Monday and hoping to follow up with her own GI    However, will consider GI consult specially worsening and not improving abdominal pain  · Continue monitoring abdominal exam

## 2018-10-26 NOTE — H&P
H&P- Jacy Owen 1950, 79 y o  female MRN: [id number]    Unit/Bed#: ED 01 Encounter: [id number]    Primary Care Provider: No primary care provider on file  Date and time admitted to hospital: 10/25/2018  4:08 PM        * Segmental colitis [facility])   Assessment & Plan    · Recently hospitalized for diverticulitis with abscess in [place]; followed up with GI as an outpatient and had a colonoscopy as an outpatient about 10 days ago  · Last night patient developed lower abdominal cramps - no nausea, vomiting, diarrhea, fevers;  some constipation and chills  · CT of the abdomen and pelvis revealed long segment thickening of the sigmoid colon likely colitis  · Labs significant for a WBC 14 30  · Admit patient for further management given nausea and vomiting out after pain medication administration  · NPO for bowel rest, IV hydration, continue Cipro and Flagyl, antiemetics, pain medication  · Patient prefers to follow up with her gastroenterologist at home in [place], will hold off on consulting GI right now       Hypertension   Assessment & Plan    · Continue blood pressure medications     Osteoporosis   Assessment & Plan    · Continue Fosamax       VTE Prophylaxis: Enoxaparin (Lovenox)  / sequential compression device   Code Status: No Order    Anticipated Length of Stay:  Patient will be admitted on an Observation basis with an anticipated length of stay of  less than 2 midnights  Justification for Hospital Stay:  Segmental colitis    Total Time for Visit, including Counseling / Coordination of Care: 20 minutes  Greater than 50% of this total time spent on direct patient counseling and coordination of care  Chief Complaint:   Abdominal Pain (c/o abd pain since last night with nausea and chills)      History of Present Illness:    Jacy Owen is a 79 y o  female with a PMH of osteoarthritis, hypertension, diverticulitis who presents with lower abdominal pain    Patient was hospitalized several weeks ago in [place] for diverticulitis with abscess  She was treated with antibiotics and then had an outpatient colonoscopy about 10 days ago which showed resolution of the abscess  Several polyps were taken for biopsy  She is appear visiting family and developed lower abdominal pain yesterday  She has reported some intermittent constipation  In the ER she is found to have leukocytosis with a white count of 14 6  CT of the abdomen and pelvis revealed segmental colitis  Patient was given Dilaudid for pain medication and subsequently started vomiting  She is admitted for observation over night  She reports some chills but no fever  She denies any diarrhea  She did feel nauseous prior to her arrival in the emergency department  Review of Systems:    Review of Systems   Constitutional: Positive for chills  HENT: Negative  Eyes: Negative  Respiratory: Negative  Cardiovascular: Negative  Gastrointestinal: Positive for abdominal pain, nausea and vomiting  Negative for diarrhea  Endocrine: Negative  Genitourinary: Negative  Musculoskeletal: Negative  Skin: Negative  Allergic/Immunologic: Negative  Neurological: Negative  Hematological: Negative  Psychiatric/Behavioral: Negative  Past Medical and Surgical History:     Past Medical History:   Diagnosis Date    Diverticulitis     Hypertension     Osteoporosis        Past Surgical History:   Procedure Laterality Date    TONSILLECTOMY         Meds/Allergies:    Prior to Admission medications    Medication Sig Start Date End Date Taking?  Authorizing Provider   alendronate (FOSAMAX) 5 mg tablet Take 5 mg by mouth weekly before breakfast   Yes Historical Provider, MD   bisoprolol-hydrochlorothiazide (Larisa Fine) 5-6 25 MG per tablet Take 2 tablets by mouth daily   Yes Historical Provider, MD   Multiple Vitamins-Minerals (MULTIVITAMIN ADULT PO) Take by mouth daily   Yes Historical Provider, MD   bisoprolol-hydrochlorothiazide [facility]) 10-6 25 MG per tablet Take 2 tablets by mouth daily  10/25/18 Yes Historical Provider, MD   ciprofloxacin (CIPRO) 500 mg tablet Take 1 tablet (500 mg total) by mouth 2 (two) times a day for 10 days 10/25/18 11/4/18  Katherine Jay DO   metroNIDAZOLE (FLAGYL) 500 mg tablet Take 1 tablet (500 mg total) by mouth every 8 (eight) hours for 10 days 10/25/18 11/4/18  Katherine Jay DO       Allergies: Allergies   Allergen Reactions    Penicillins        Social History:     Marital Status: Single   Substance Use History:   History   Alcohol Use    Yes     Comment: one glass of wine most nights     History   Smoking Status    Never Smoker   Smokeless Tobacco    Never Used     History   Drug Use No       Family History:    Family History   Problem Relation Age of Onset    Heart attack Mother     Heart failure Mother     Stroke Father     Hypertension Father     Alzheimer's disease Father     Osteoporosis Sister     Heart attack Brother     Arthritis Brother     Aortic aneurysm Brother        Physical Exam:     Vitals:   Blood Pressure: 129/60 (10/25/18 2024)  Pulse: 88 (10/25/18 2024)  Temperature: 99 2 °F (37 3 °C) (10/25/18 2024)  Temp Source: Tympanic (10/25/18 2024)  Respirations: 18 (10/25/18 2024)  Height: 5' 3" (160 cm) (10/25/18 1603)  Weight - Scale: 65 8 kg (145 lb) (10/25/18 1603)  SpO2: 94 % (10/25/18 2024)    Physical Exam   Constitutional: She is oriented to person, place, and time  She appears well-developed and well-nourished  HENT:   Head: Normocephalic and atraumatic  Eyes: Pupils are equal, round, and reactive to light  Neck: Normal range of motion  Cardiovascular: Normal rate, regular rhythm and normal heart sounds  Pulmonary/Chest: Effort normal and breath sounds normal  No respiratory distress  Abdominal: Soft  Bowel sounds are normal  There is tenderness  Musculoskeletal: Normal range of motion  She exhibits no edema     Neurological: She is alert and oriented to person, place, and time  Skin: Skin is warm and dry  Nursing note and vitals reviewed  Additional Data:     Lab Results: I have personally reviewed pertinent reports  Results from last 7 days  Lab Units 10/25/18  1641   WBC Thousand/uL 14 30*   HEMOGLOBIN g/dL 13 7   HEMATOCRIT % 42 2   PLATELETS Thousands/uL 277   NEUTROS PCT % 82*   LYMPHS PCT % 10*   MONOS PCT % 6   EOS PCT % 1       Results from last 7 days  Lab Units 10/25/18  1641   SODIUM mmol/L 135*   POTASSIUM mmol/L 3 5   CHLORIDE mmol/L 98*   CO2 mmol/L 29   BUN mg/dL 14   CREATININE mg/dL 0 81   CALCIUM mg/dL 9 5   ALK PHOS U/L 70   ALT U/L 35   AST U/L 19           Imaging: I have personally reviewed pertinent reports  CT abdomen pelvis with contrast   Final Result by Kenyon Dakin, MD (10/25 1749)      Long segment thickening of the sigmoid colon in keeping with a nonspecific segmental colitis, likely infectious or inflammatory  This does not have the typical appearance for diverticulitis given the long segment of involvement  No pericolonic abscess  No bowel obstruction  The study was marked in [facility] for immediate notification  Workstation performed: [id number]             CT abdomen pelvis with contrast   Final Result      Long segment thickening of the sigmoid colon in keeping with a nonspecific segmental colitis, likely infectious or inflammatory  This does not have the typical appearance for diverticulitis given the long segment of involvement  No pericolonic abscess  No bowel obstruction  The study was marked in [facility] for immediate notification  Workstation performed: [id number]             EKG, Pathology, and Other Studies Reviewed on Admission:   · EKG: not applicable    Allscripts / Epic Records Reviewed: Yes     ** Please Note: This note has been constructed using a voice recognition system   **

## 2018-10-26 NOTE — PLAN OF CARE
Problem: DISCHARGE PLANNING - CARE MANAGEMENT  Goal: Discharge to post-acute care or home with appropriate resources  INTERVENTIONS:  - Conduct assessment to determine patient/family and health care team treatment goals, and need for post-acute services based on payer coverage, community resources, and patient preferences, and barriers to discharge  - Address psychosocial, clinical, and financial barriers to discharge as identified in assessment in conjunction with the patient/family and health care team  - Arrange appropriate level of post-acute services according to patient's   needs and preference and payer coverage in collaboration with the physician and health care team  - Communicate with and update the patient/family, physician, and health care team regarding progress on the discharge plan  - Arrange appropriate transportation to post-acute venues    [address] [place] [address]  Outcome: Progressing  Plan is for pt to return home to [place] when discharged

## 2018-10-26 NOTE — UTILIZATION REVIEW
Initial Clinical Review    Admission: Date/Time/Statement: 10/25/18 2009    Orders Placed This Encounter   Procedures    Place in Observation (expected length of stay for this patient is less than two midnights)     Standing Status:   Standing     Number of Occurrences:   1     Order Specific Question:   Admitting Physician     Answer:   Dameon Ann     Order Specific Question:   Level of Care     Answer:   Med Surg [16]         ED: Date/Time/Mode of Arrival:   ED Arrival Information     Expected Arrival Acuity Means of Arrival Escorted By Service Admission Type    - 10/25/2018 15:46 Urgent Walk-In [facility] General Medicine Urgent    Arrival Complaint    SEVERE ABDOMINAL PAIN          Chief Complaint:   Chief Complaint   Patient presents with    Abdominal Pain     c/o abd pain since last night with nausea and chills       History of Illness: Hilda Randall is a 79 y o  female with a PMH of osteoarthritis, hypertension, diverticulitis who presents with lower abdominal pain  Patient was hospitalized several weeks ago in [place] for diverticulitis with abscess  She was treated with antibiotics and then had an outpatient colonoscopy about 10 days ago which showed resolution of the abscess  Several polyps were taken for biopsy  She is appear visiting family and developed lower abdominal pain yesterday  She has reported some intermittent constipation  In the ER she is found to have leukocytosis with a white count of 14 6  CT of the abdomen and pelvis revealed segmental colitis  Patient was given Dilaudid for pain medication and subsequently started vomiting  She is admitted for observation over night  She reports some chills but no fever  She denies any diarrhea    She did feel nauseous prior to her arrival in the emergency department        ED Vital Signs:   ED Triage Vitals [10/25/18 1603]   Temperature Pulse Respirations Blood Pressure SpO2   98 7 °F (37 1 °C) 68 16 128/60 99 %      Temp Source Heart Rate Source Patient Position - Orthostatic VS BP Location FiO2 (%)   Tympanic Monitor Sitting Right arm --      Pain Score       9        Wt Readings from Last 1 Encounters:   10/25/18 69 2 kg (152 lb 8 9 oz)       Abnormal Labs/Diagnostic Test Results: na 134 glucose 109 wbc 20 94, ua nitrite positive  Ct abdomen pelvis Long segment thickening of the sigmoid colon in keeping with a nonspecific segmental colitis, likely infectious or inflammatory  This does not have the typical appearance for diverticulitis given the long segment of involvement  No pericolonic abscess  No bowel obstruction  ED Treatment:   Medication Administration from 10/25/2018 1546 to 10/25/2018 2046       Date/Time Order Dose Route Action Action by Comments     10/25/2018 1854 sodium chloride 0 9 % bolus 1,000 mL 0 mL Intravenous Stopped Callie Naik RN      10/25/2018 1641 sodium chloride 0 9 % bolus 1,000 mL 1,000 mL Intravenous Humzatelliott[address]ralphet 37 Rsahmi Amaral RN      10/25/2018 1706 morphine (PF) 4 mg/mL injection 4 mg 4 mg Intravenous Given Callie Naik RN      10/25/2018 1745 iohexol (OMNIPAQUE) 350 MG/ML injection (MULTI-DOSE) 100 mL 100 mL Intravenous Given Floy Hint Muzzicato      10/25/2018 1851 ciprofloxacin (CIPRO) tablet 500 mg 500 mg Oral Given Callie Naik RN      10/25/2018 1851 metroNIDAZOLE (FLAGYL) tablet 500 mg 500 mg Oral Given Callie Naik RN      10/25/2018 1852 HYDROmorphone (DILAUDID) injection 1 mg 1 mg Intravenous Given Callie Naik RN      10/25/2018 1959 ondansetron (ZOFRAN) injection 4 mg 4 mg Intravenous Given Maria L Giraldo RN           Past Medical/Surgical History:    Active Ambulatory Problems     Diagnosis Date Noted    No Active Ambulatory Problems     Resolved Ambulatory Problems     Diagnosis Date Noted    No Resolved Ambulatory Problems     Past Medical History:   Diagnosis Date    Diverticulitis     Hypertension     Osteoporosis        Admitting Diagnosis: Colitis [K52 9]  Abdominal pain [R10 9]    Age/Sex: 79 y o  female    Assessment/Plan:   Segmental colitis ([address] )   Assessment & Plan     · Recently hospitalized for diverticulitis with abscess in [place]; followed up with GI as an outpatient and had a colonoscopy as an outpatient about 10 days ago  · Last night patient developed lower abdominal cramps - no  vomiting, diarrhea, fevers;  some constipation and chills  · CT of the abdomen and pelvis revealed long segment thickening of the sigmoid colon likely colitis  · Labs significant for a WBC 14 30  · Admit patient for further management given nausea and vomiting out after pain medication administration  · NPO for bowel rest, IV hydration, continue Cipro and Flagyl, antiemetics, pain medication  · Patient prefers to follow up with her gastroenterologist at home in [place], will hold off on consulting GI right now   Hypertension   Assessment & Plan     · Continue blood pressure medications   Osteoporosis   Assessment & Plan     · Continue Fosamax    VTE Prophylaxis: Enoxaparin (Lovenox)  / sequential compression device   Code Status: No Order  Anticipated Length of Stay:  Patient will be admitted on an Observation basis with an anticipated length of stay of  less than 2 midnights     Justification for Hospital Stay:  Segmental colitis    Admission Orders:  Observation  gmf  Npo     Scheduled Meds:   Current Facility-Administered Medications:  bisoprolol 10 mg Oral Daily Blaine Cap, CRNP    enoxaparin 40 mg Subcutaneous Daily Blaine Cap, CRNP    levofloxacin 750 mg Intravenous Q24H Blaine Cap, CRNP    And        metroNIDAZOLE 500 mg Intravenous Q8H Blaine Cap, CRNP Last Rate: 500 mg (10/26/18 0455)   melatonin 3 mg Oral HS PRN Blaine Cap, CRNP    morphine injection 2 mg Intravenous Q3H PRN Blaine Cap, CRNP    multivitamin-minerals 1 tablet Oral Daily Blaine Cap, CRNP    sodium chloride 75 mL/hr Intravenous Continuous Blaine Cap, CRNP Last Rate: 75 mL/hr (10/25/18 2118)     Continuous Infusions:   sodium chloride 75 mL/hr Last Rate: 75 mL/hr (10/25/18 2118)     PRN Meds: melatonin    morphine injection

## 2018-10-26 NOTE — ASSESSMENT & PLAN NOTE
· Recently hospitalized for diverticulitis with abscess in [place]; followed up with GI as an outpatient and had a colonoscopy as an outpatient about 10 days ago  · Last night patient developed lower abdominal cramps - no nausea, vomiting, diarrhea, fevers;  some constipation and chills  · CT of the abdomen and pelvis revealed long segment thickening of the sigmoid colon likely colitis  · Labs significant for a WBC 14 30  · Admit patient for further management given nausea and vomiting out after pain medication administration  · NPO for bowel rest, IV hydration, continue Cipro and Flagyl, antiemetics, pain medication  · Patient prefers to follow up with her gastroenterologist at home in [place], will hold off on consulting GI right now

## 2018-10-26 NOTE — ASSESSMENT & PLAN NOTE
Mild hyponatremia noted 135>>134  Likely from episode of vomiting in the ED  Will continue to monitor sodium level and will do further test if clinically indicated      · Will trend sodium level for now and continue IV fluids

## 2018-10-27 LAB
ANION GAP SERPL CALCULATED.3IONS-SCNC: 8 MMOL/L (ref 4–13)
BASOPHILS # BLD AUTO: 0.02 THOUSANDS/ΜL (ref 0–0.1)
BASOPHILS NFR BLD AUTO: 0 % (ref 0–1)
BUN SERPL-MCNC: 10 MG/DL (ref 5–25)
CALCIUM SERPL-MCNC: 8.4 MG/DL (ref 8.3–10.1)
CHLORIDE SERPL-SCNC: 103 MMOL/L (ref 100–108)
CO2 SERPL-SCNC: 24 MMOL/L (ref 21–32)
CREAT SERPL-MCNC: 0.62 MG/DL (ref 0.6–1.3)
EOSINOPHIL # BLD AUTO: 0.03 THOUSAND/ΜL (ref 0–0.61)
EOSINOPHIL NFR BLD AUTO: 0 % (ref 0–6)
ERYTHROCYTE [DISTWIDTH] IN BLOOD BY AUTOMATED COUNT: 15.2 % (ref 11.6–15.1)
GFR SERPL CREATININE-BSD FRML MDRD: 94 ML/MIN/1.73SQ M
GLUCOSE P FAST SERPL-MCNC: 107 MG/DL (ref 65–99)
GLUCOSE SERPL-MCNC: 107 MG/DL (ref 65–140)
HCT VFR BLD AUTO: 34.3 % (ref 34.8–46.1)
HGB BLD-MCNC: 11.2 G/DL (ref 11.5–15.4)
IMM GRANULOCYTES # BLD AUTO: 0.13 THOUSAND/UL (ref 0–0.2)
IMM GRANULOCYTES NFR BLD AUTO: 1 % (ref 0–2)
LYMPHOCYTES # BLD AUTO: 0.86 THOUSANDS/ΜL (ref 0.6–4.47)
LYMPHOCYTES NFR BLD AUTO: 6 % (ref 14–44)
MCH RBC QN AUTO: 29.4 PG (ref 26.8–34.3)
MCHC RBC AUTO-ENTMCNC: 32.7 G/DL (ref 31.4–37.4)
MCV RBC AUTO: 90 FL (ref 82–98)
MONOCYTES # BLD AUTO: 0.72 THOUSAND/ΜL (ref 0.17–1.22)
MONOCYTES NFR BLD AUTO: 5 % (ref 4–12)
MRSA NOSE QL CULT: NORMAL
NEUTROPHILS # BLD AUTO: 13.15 THOUSANDS/ΜL (ref 1.85–7.62)
NEUTS SEG NFR BLD AUTO: 88 % (ref 43–75)
NRBC BLD AUTO-RTO: 0 /100 WBCS
PLATELET # BLD AUTO: 191 THOUSANDS/UL (ref 149–390)
PMV BLD AUTO: 9.2 FL (ref 8.9–12.7)
POTASSIUM SERPL-SCNC: 4.1 MMOL/L (ref 3.5–5.3)
RBC # BLD AUTO: 3.81 MILLION/UL (ref 3.81–5.12)
SODIUM SERPL-SCNC: 135 MMOL/L (ref 136–145)
WBC # BLD AUTO: 14.91 THOUSAND/UL (ref 4.31–10.16)

## 2018-10-27 PROCEDURE — 85025 COMPLETE CBC W/AUTO DIFF WBC: CPT | Performed by: NURSE PRACTITIONER

## 2018-10-27 PROCEDURE — 99232 SBSQ HOSP IP/OBS MODERATE 35: CPT | Performed by: FAMILY MEDICINE

## 2018-10-27 PROCEDURE — 80048 BASIC METABOLIC PNL TOTAL CA: CPT | Performed by: NURSE PRACTITIONER

## 2018-10-27 RX ORDER — TRAMADOL HYDROCHLORIDE 50 MG/1
50 TABLET ORAL EVERY 6 HOURS PRN
Status: DISCONTINUED | OUTPATIENT
Start: 2018-10-27 | End: 2018-10-28 | Stop reason: HOSPADM

## 2018-10-27 RX ADMIN — BISOPROLOL FUMARATE 10 MG: 5 TABLET ORAL at 08:28

## 2018-10-27 RX ADMIN — MELATONIN TAB 3 MG 3 MG: 3 TAB at 22:17

## 2018-10-27 RX ADMIN — TRAMADOL HYDROCHLORIDE 50 MG: 50 TABLET, FILM COATED ORAL at 22:17

## 2018-10-27 RX ADMIN — METRONIDAZOLE 500 MG: 500 INJECTION, SOLUTION INTRAVENOUS at 10:36

## 2018-10-27 RX ADMIN — MORPHINE SULFATE 2 MG: 2 INJECTION, SOLUTION INTRAMUSCULAR; INTRAVENOUS at 07:08

## 2018-10-27 RX ADMIN — LEVOFLOXACIN 750 MG: 5 INJECTION, SOLUTION INTRAVENOUS at 20:54

## 2018-10-27 RX ADMIN — METRONIDAZOLE 500 MG: 500 INJECTION, SOLUTION INTRAVENOUS at 18:03

## 2018-10-27 RX ADMIN — METRONIDAZOLE 500 MG: 500 INJECTION, SOLUTION INTRAVENOUS at 02:23

## 2018-10-27 RX ADMIN — SODIUM CHLORIDE 75 ML/HR: 0.9 INJECTION, SOLUTION INTRAVENOUS at 08:29

## 2018-10-27 RX ADMIN — TRAMADOL HYDROCHLORIDE 50 MG: 50 TABLET, FILM COATED ORAL at 15:13

## 2018-10-27 RX ADMIN — Medication 1 TABLET: at 08:28

## 2018-10-27 RX ADMIN — ENOXAPARIN SODIUM 40 MG: 40 INJECTION SUBCUTANEOUS at 08:28

## 2018-10-27 NOTE — PROGRESS NOTES
[facility] Progress Note  Patient: Guy Boyle 79 y o  female   MRN: [id number]  PCP: No primary care provider on file  Unit/Bed#: [address] [place] [address] Encounter: [id number]  Date Of Visit: 10/27/18    Problem List:    Principal Problem:    Segmental colitis ([facility] )  Active Problems:    Hyponatremia    Essential hypertension    Leukocytosis      Assessment & Plan:    * Segmental colitis ([facility] )   Assessment & Plan    Recently hospitalized for diverticulitis with abscess in [place]; followed up with GI as an outpatient and had a colonoscopy as an outpatient about 10 days ago where polyps were removed  CT of the abdomen and pelvis revealed long segment thickening of the sigmoid colon likely colitis with significant leukocytosis  IV antibiotics started in the ED  · Continue IV Levaquin and Flagyl  · Tolerating clears  Advance to full liquids and will monitor overnight  · Continue IVF, pain medications p r n  · Patient to fly back to [place] on Monday and hoping to follow up with her own GI  However, will consider GI consult if symptoms worsen or abdominal pain fails to improve     Hyponatremia   Assessment & Plan    Mild hyponatremia  Sodium level stable  Continue IVF     Leukocytosis   Assessment & Plan    Likely due to segmental colitis  Patient afebrile  · WBC trending down  Repeat lab work in a m  Essential hypertension   Assessment & Plan    Continue Bisoprolol           VTE Pharmacologic Prophylaxis:   Pharmacologic: Enoxaparin (Lovenox)  Mechanical VTE Prophylaxis in Place: Yes    Patient Centered Rounds: I have performed bedside rounds with nursing staff today  Discussions with Specialists or Other Care Team Provider: Yes    Education and Discussions with Family / Patient:Yes    Time Spent for Care: 30 minutes  More than 50% of total time spent on counseling and coordination of care as described above      Current Length of Stay: 0 day(s)    Current Patient Status: Observation Discharge Plan: home    Code Status: Level 1 - Full Code    Certification Statement: The patient will continue to require additional inpatient hospital stay due to Colitis      Subjective:   Reports improvement in her abdominal pain  Tolerating clears  Objective:     Vitals:   Temp (24hrs), Av 2 °F (37 3 °C), Min:98 6 °F (37 °C), Max:100 °F (37 8 °C)    Temp:  [98 6 °F (37 °C)-100 °F (37 8 °C)] 99 °F (37 2 °C)  HR:  [75-91] 75  Resp:  [17-18] 18  BP: (123-132)/(54-61) 132/61  SpO2:  [92 %-98 %] 94 %  Body mass index is 27 02 kg/m²  Input and Output Summary (last 24 hours): Intake/Output Summary (Last 24 hours) at 10/27/18 1508  Last data filed at 10/27/18 2512   Gross per 24 hour   Intake          2638 75 ml   Output                0 ml   Net          2638 75 ml       Physical Exam:     Physical Exam   Constitutional: She is oriented to person, place, and time  She appears well-developed and well-nourished  No distress  HENT:   Head: Normocephalic and atraumatic  Mouth/Throat: Oropharynx is clear and moist    Eyes: EOM are normal  Right eye exhibits no discharge  Left eye exhibits no discharge  No scleral icterus  Neck: Neck supple  Cardiovascular: Normal rate and regular rhythm  Pulmonary/Chest: Effort normal and breath sounds normal  No respiratory distress  She has no wheezes  She has no rales  Abdominal: Soft  Bowel sounds are normal  She exhibits no distension  There is tenderness (mild generalized but mostly in LLQ and suprapubic region)  There is no guarding  Musculoskeletal: She exhibits no edema  Neurological: She is alert and oriented to person, place, and time  No cranial nerve deficit  Skin: Skin is dry  She is not diaphoretic  Psychiatric: She has a normal mood and affect         Additional Data:     Labs:      Results from last 7 days  Lab Units 10/27/18  0631   WBC Thousand/uL 14 91*   HEMOGLOBIN g/dL 11 2*   HEMATOCRIT % 34 3*   PLATELETS Thousands/uL 191 NEUTROS PCT % 88*   LYMPHS PCT % 6*   MONOS PCT % 5   EOS PCT % 0       Results from last 7 days  Lab Units 10/27/18  0631  10/25/18  1641   SODIUM mmol/L 135*  < > 135*   POTASSIUM mmol/L 4 1  < > 3 5   CHLORIDE mmol/L 103  < > 98*   CO2 mmol/L 24  < > 29   BUN mg/dL 10  < > 14   CREATININE mg/dL 0 62  < > 0 81   CALCIUM mg/dL 8 4  < > 9 5   ALK PHOS U/L  --   --  70   ALT U/L  --   --  35   AST U/L  --   --  19   < > = values in this interval not displayed  * I Have Reviewed All Lab Data Listed Above  * Additional Pertinent Lab Tests Reviewed: [facility] Admission Reviewed    Imaging:  Ct Abdomen Pelvis With Contrast    Result Date: 10/25/2018  Narrative: CT ABDOMEN AND PELVIS WITH IV CONTRAST INDICATION:   abdominal pain  Pain with nausea and chills  COMPARISON:  None  TECHNIQUE:  CT examination of the abdomen and pelvis was performed  Axial, sagittal, and coronal 2D reformatted images were created from the source data and submitted for interpretation  Radiation dose length product (DLP) for this visit:  465 35 mGy-cm   This examination, like all CT scans performed in the [facility], was performed utilizing techniques to minimize radiation dose exposure, including the use of iterative  reconstruction and automated exposure control  IV Contrast:  Given; the type is not currently available  Enteric Contrast:  Enteric contrast was not administered  FINDINGS: Limited by motion  ABDOMEN LOWER CHEST:  Moderate to large sliding-type hiatal hernia  LIVER/BILIARY TREE:  Unremarkable  GALLBLADDER:  No calcified gallstones  No pericholecystic inflammatory change  SPLEEN:  Unremarkable  PANCREAS:  Unremarkable  ADRENAL GLANDS:  Unremarkable  KIDNEYS/URETERS:  Unremarkable  No hydronephrosis  STOMACH AND BOWEL:  Long segment thickening of the sigmoid colon in keeping with a nonspecific segmental colitis, likely infectious or inflammatory    This does not have the typical appearance for diverticulitis given the long segment  No bowel obstruction  Scattered colonic diverticuli  No bowel pneumatosis  APPENDIX:  Noninflamed  ABDOMINOPELVIC CAVITY:  No ascites or free intraperitoneal air  No lymphadenopathy  VESSELS:  Unremarkable for patient's age  PELVIS REPRODUCTIVE ORGANS:  Unremarkable for patient's age  URINARY BLADDER:  Small dot of air within the bladder without wall thickening likely secondary to recent catheterization  ABDOMINAL WALL/INGUINAL REGIONS:  Unremarkable  OSSEOUS STRUCTURES:  No acute fracture or destructive osseous lesion  Impression: Long segment thickening of the sigmoid colon in keeping with a nonspecific segmental colitis, likely infectious or inflammatory  This does not have the typical appearance for diverticulitis given the long segment of involvement  No pericolonic abscess  No bowel obstruction  The study was marked in [facility] for immediate notification   Workstation performed: [id number]     Imaging Reports Reviewed by myself    Cultures:   Blood Culture: No results found for: BLOODCX  Urine Culture: No results found for: URINECX  Sputum Culture: No components found for: SPUTUMCX  Wound Culture: No results found for: WOUNDCULT    Last 24 Hours Medication List:     Current Facility-Administered Medications:  acetaminophen 650 mg Oral Q6H PRN Blaine Cap, CRNP    bisoprolol 10 mg Oral Daily Blaine Cap, CRNP    enoxaparin 40 mg Subcutaneous Daily Blaine Cap, CRNP    levofloxacin 750 mg Intravenous Q24H Blaine Cap, CRNP Last Rate: 750 mg (10/26/18 1948)   And        metroNIDAZOLE 500 mg Intravenous Q8H Blaine Cap, CRNP Last Rate: 500 mg (10/27/18 1036)   melatonin 3 mg Oral HS PRN Blaine Cap, CRNP    multivitamin-minerals 1 tablet Oral Daily Blaine Cap, CRNP    sodium chloride 75 mL/hr Intravenous Continuous Blaine Cap, CRNP Last Rate: 75 mL/hr (10/27/18 0829)   traMADol 50 mg Oral Q6H PRN Nancy Bermeo DO Today, Patient Was Seen By: Christie Gomez DO    ** Please Note: "This note has been constructed using a voice recognition system  Therefore there may be syntax, spelling, and/or grammatical errors   Please call if you have any questions  "**

## 2018-10-27 NOTE — ASSESSMENT & PLAN NOTE
· Recently hospitalized for diverticulitis with abscess in [place]; followed up with GI as an outpatient and had a colonoscopy as an outpatient about 10 days prior to admission where polyps were removed  CT of the abdomen and pelvis revealed long segment thickening of the sigmoid colon likely colitis with significant leukocytosis  · Patient was on IVF, IV Levaquin and Flagyl here and transitioned to p o  Antibiotics on discharge  · Tolerating diet    Patient advised to follow up with her PMD and gastroenterologist after discharge  · Patient declined GI input here unless her symptoms worsened or abdominal pain failed to improve

## 2018-10-27 NOTE — UTILIZATION REVIEW
Continued Stay Review    Thank you,  [address]  Utilization Review Department  Phone: [phone]; Fax [phone]  ATTENTION: Please call with any questions or concerns to [phone]  and carefully follow the prompts so that you are directed to the right person  Send all requests for admission clinical reviews, approved or denied determinations and any other requests to fax [phone]  All voicemails are confidential      Date:10/27/2018    Vital Signs: /60 (BP Location: Right arm)   Pulse 80   Temp 98 6 °F (37 °C) (Oral)   Resp 17   Ht 5' 3" (1 6 m)   Wt 69 2 kg (152 lb 8 9 oz)   SpO2 98%   BMI 27 02 kg/m²     Medications:   Scheduled Meds:   Current Facility-Administered Medications:  acetaminophen 650 mg Oral Q6H PRN 11/26 x 1    bisoprolol 10 mg Oral Daily    enoxaparin 40 mg Subcutaneous Daily    levofloxacin 750 mg Intravenous Q24H    And       metroNIDAZOLE 500 mg Intravenous Q8H    melatonin 3 mg Oral HS PRN 10/25 x 1  10/26 x 1      morphine injection 2 mg Intravenous Q3H PRN 10/26 x 5  10/27 x 1    multivitamin-minerals 1 tablet Oral Daily      Continuous Infusions:  NSS @ 75 ml/hr     Nursing Orders - VS q 4 - up & OOB as tolerated - SCD's to le's- Diet Clears     Abnormal Labs/Diagnostic Results: 10/27 - Na 135 - Wbc 14 91 - H/H 11 2/34 3    Age/Sex: 79 y o  female     Assessment/Plan: 1  Segmental colitis - continue with IV Levaquin and Flagyl for now, continue IVF  Patient on clear liquid diet  Patient still with leukocytosis  Repeat lab work in a   Patient has her own gastroenterologist in [place] and will follow up with Him after discharge     2  Hyponatremia - likely hypovolemic hyponatremia  Repeat lab work in a    And monitor       Discharge Plan:

## 2018-10-28 VITALS
TEMPERATURE: 98.6 F | RESPIRATION RATE: 18 BRPM | HEART RATE: 69 BPM | BODY MASS INDEX: 27.03 KG/M2 | DIASTOLIC BLOOD PRESSURE: 66 MMHG | WEIGHT: 152.56 LBS | SYSTOLIC BLOOD PRESSURE: 144 MMHG | OXYGEN SATURATION: 95 % | HEIGHT: 63 IN

## 2018-10-28 PROBLEM — E87.1 HYPONATREMIA: Status: RESOLVED | Noted: 2018-10-26 | Resolved: 2018-10-28

## 2018-10-28 PROBLEM — D72.829 LEUKOCYTOSIS: Status: RESOLVED | Noted: 2018-10-26 | Resolved: 2018-10-28

## 2018-10-28 LAB
ERYTHROCYTE [DISTWIDTH] IN BLOOD BY AUTOMATED COUNT: 15 % (ref 11.6–15.1)
HCT VFR BLD AUTO: 34.4 % (ref 34.8–46.1)
HGB BLD-MCNC: 11.1 G/DL (ref 11.5–15.4)
MCH RBC QN AUTO: 29.1 PG (ref 26.8–34.3)
MCHC RBC AUTO-ENTMCNC: 32.3 G/DL (ref 31.4–37.4)
MCV RBC AUTO: 90 FL (ref 82–98)
PLATELET # BLD AUTO: 247 THOUSANDS/UL (ref 149–390)
PMV BLD AUTO: 8.4 FL (ref 8.9–12.7)
RBC # BLD AUTO: 3.82 MILLION/UL (ref 3.81–5.12)
WBC # BLD AUTO: 8.85 THOUSAND/UL (ref 4.31–10.16)

## 2018-10-28 PROCEDURE — 85027 COMPLETE CBC AUTOMATED: CPT | Performed by: FAMILY MEDICINE

## 2018-10-28 PROCEDURE — 99239 HOSP IP/OBS DSCHRG MGMT >30: CPT | Performed by: FAMILY MEDICINE

## 2018-10-28 RX ORDER — METRONIDAZOLE 500 MG/1
500 TABLET ORAL EVERY 8 HOURS SCHEDULED
Qty: 22 TABLET | Refills: 0 | Status: SHIPPED | OUTPATIENT
Start: 2018-10-28 | End: 2018-11-05

## 2018-10-28 RX ORDER — LEVOFLOXACIN 500 MG/1
500 TABLET, FILM COATED ORAL DAILY
Qty: 8 TABLET | Refills: 0 | Status: SHIPPED | OUTPATIENT
Start: 2018-10-28 | End: 2018-11-05

## 2018-10-28 RX ADMIN — ENOXAPARIN SODIUM 40 MG: 40 INJECTION SUBCUTANEOUS at 09:38

## 2018-10-28 RX ADMIN — METRONIDAZOLE 500 MG: 500 INJECTION, SOLUTION INTRAVENOUS at 09:38

## 2018-10-28 RX ADMIN — METRONIDAZOLE 500 MG: 500 INJECTION, SOLUTION INTRAVENOUS at 01:41

## 2018-10-28 RX ADMIN — SODIUM CHLORIDE 75 ML/HR: 0.9 INJECTION, SOLUTION INTRAVENOUS at 00:14

## 2018-10-28 RX ADMIN — Medication 1 TABLET: at 09:37

## 2018-10-28 RX ADMIN — BISOPROLOL FUMARATE 10 MG: 5 TABLET ORAL at 09:37

## 2018-10-28 NOTE — NURSING NOTE
Patient discharged to home  IV removed  Discharge paperwork reviewed with patent  Scripts reviewed and given to patient

## 2018-10-28 NOTE — DISCHARGE SUMMARY
Discharge Summary - [facility]    Patient Information: Ky López 79 y o  female MRN: [id number]  Unit/Bed#: [facility] Encounter: [id number]    Discharging Physician / Practitioner: Jah Solo DO  PCP: No primary care provider on file  Admission Date: 10/25/2018  Discharge Date: 10/28/18    Reason for Admission: Abdominal Pain (c/o abd pain since last night with nausea and chills)      Discharge Diagnoses:     Principal Problem:    Segmental colitis ([facility] )  Active Problems:    Essential hypertension  Resolved Problems:    Hyponatremia    Leukocytosis        * Segmental colitis ([facility] )   Assessment & Plan    · Recently hospitalized for diverticulitis with abscess in [place]; followed up with GI as an outpatient and had a colonoscopy as an outpatient about 10 days prior to admission where polyps were removed  CT of the abdomen and pelvis revealed long segment thickening of the sigmoid colon likely colitis with significant leukocytosis  · Patient was on IVF, IV Levaquin and Flagyl here and transitioned to p o  Antibiotics on discharge  · Tolerating diet  Patient advised to follow up with her PMD and gastroenterologist after discharge  · Patient declined GI input here unless her symptoms worsened or abdominal pain failed to improve     Hyponatremia-resolved as of 10/28/2018   Assessment & Plan    Mild hyponatremia  Sodium level stable  Essential hypertension   Assessment & Plan    Continue Bisoprolol-HCTZ as she uses at home     Leukocytosis-resolved as of 10/28/2018   Assessment & Plan    Likely due to segmental colitis  Leukocytosis resolved         Consultations During Hospital Stay:  None    Procedures Performed:     · none    Significant Findings:     See hospital course and above    Imaging while in hospital:    Ct Abdomen Pelvis With Contrast    Result Date: 10/25/2018  Narrative: CT ABDOMEN AND PELVIS WITH IV CONTRAST INDICATION:   abdominal pain    Pain with nausea and chills  COMPARISON:  None  TECHNIQUE:  CT examination of the abdomen and pelvis was performed  Axial, sagittal, and coronal 2D reformatted images were created from the source data and submitted for interpretation  Radiation dose length product (DLP) for this visit:  465 35 mGy-cm   This examination, like all CT scans performed in the [facility], was performed utilizing techniques to minimize radiation dose exposure, including the use of iterative  reconstruction and automated exposure control  IV Contrast:  Given; the type is not currently available  Enteric Contrast:  Enteric contrast was not administered  FINDINGS: Limited by motion  ABDOMEN LOWER CHEST:  Moderate to large sliding-type hiatal hernia  LIVER/BILIARY TREE:  Unremarkable  GALLBLADDER:  No calcified gallstones  No pericholecystic inflammatory change  SPLEEN:  Unremarkable  PANCREAS:  Unremarkable  ADRENAL GLANDS:  Unremarkable  KIDNEYS/URETERS:  Unremarkable  No hydronephrosis  STOMACH AND BOWEL:  Long segment thickening of the sigmoid colon in keeping with a nonspecific segmental colitis, likely infectious or inflammatory  This does not have the typical appearance for diverticulitis given the long segment  No bowel obstruction  Scattered colonic diverticuli  No bowel pneumatosis  APPENDIX:  Noninflamed  ABDOMINOPELVIC CAVITY:  No ascites or free intraperitoneal air  No lymphadenopathy  VESSELS:  Unremarkable for patient's age  PELVIS REPRODUCTIVE ORGANS:  Unremarkable for patient's age  URINARY BLADDER:  Small dot of air within the bladder without wall thickening likely secondary to recent catheterization  ABDOMINAL WALL/INGUINAL REGIONS:  Unremarkable  OSSEOUS STRUCTURES:  No acute fracture or destructive osseous lesion  Impression: Long segment thickening of the sigmoid colon in keeping with a nonspecific segmental colitis, likely infectious or inflammatory    This does not have the typical appearance for diverticulitis given the long segment of involvement  No pericolonic abscess  No bowel obstruction  The study was marked in [facility] for immediate notification  Workstation performed: [id number]       Incidental Findings:   · none    Test Results Pending at Discharge (will require follow up):   · As per After Visit Summary     Outpatient Tests Requested:  · none    Complications:  See hospital course and above    Hospital Course:     Guy Boyle is a 79 y o  female patient who originally presented to the hospital on 10/25/2018 due to lower abdominal pain  she was hospitalized several weeks ago in [place] for diverticulitis with abscess  she also had outpatient colonoscopy in [place]  in the ER she was noted to have leukocytosis and CT scan showed findings suggestive of segmental colitis  She was admitted and started on IV antibiotics with improvement in her symptoms  Leukocytosis resolved by day of discharge  Discharge plan was discussed with not only the patient but also her boyfriend who was present at bedside    Please see above list of diagnoses and related plan for additional information  Condition at Discharge: stable     Discharge Day Visit / Exam:     Subjective:  Reports mild abdominal pain, 1-2/10 in intensity  Tolerating diet  Wants to go home    Vitals: Blood Pressure: 144/66 (10/28/18 0830)  Pulse: 69 (10/28/18 0830)  Temperature: 98 6 °F (37 °C) (10/28/18 0830)  Temp Source: Oral (10/28/18 0830)  Respirations: 18 (10/28/18 0830)  Height: 5' 3" (160 cm) (10/25/18 2125)  Weight - Scale: 69 2 kg (152 lb 8 9 oz) (10/25/18 2125)  SpO2: 95 % (10/28/18 0830)  Exam:   Physical Exam   Constitutional: She is oriented to person, place, and time  She appears well-developed and well-nourished  No distress  HENT:   Head: Normocephalic and atraumatic  Mouth/Throat: Oropharynx is clear and moist    Eyes: EOM are normal  Right eye exhibits no discharge  Left eye exhibits no discharge  No scleral icterus     Neck: Neck supple  No tracheal deviation present  Cardiovascular: Normal rate and regular rhythm  Pulmonary/Chest: Effort normal and breath sounds normal  No respiratory distress  She has no wheezes  She has no rales  Abdominal: Soft  Bowel sounds are normal  She exhibits no distension  There is no tenderness  Musculoskeletal: She exhibits no edema  Neurological: She is alert and oriented to person, place, and time  No cranial nerve deficit  Skin: Skin is dry  She is not diaphoretic  Discharge instructions/Information to patient and family:(Discharge Medications and Follow up):   See after visit summary for information provided to patient and family  Provisions for Follow-Up Care:  See after visit summary for information related to follow-up care and any pertinent home health orders  Disposition: Home    Planned Readmission:  No     Discharge Statement:  I spent > 30 minutes discharging the patient  This time was spent on the day of discharge  I had direct contact with the patient on the day of discharge  Greater than 50% of the total time was spent examining patient, answering all patient questions, arranging and discussing plan of care with patient as well as directly providing post-discharge instructions  Additional time then spent on discharge activities  Discharge Medications:  See after visit summary for reconciled discharge medications provided to patient and family  ** Please Note: "This note has been constructed using a voice recognition system  Therefore there may be syntax, spelling, and/or grammatical errors   Please call if you have any questions  "**

## 2018-10-28 NOTE — PHYSICIAN ADVISOR
Current patient class: Observation  The patient is currently on Hospital Day: 3 at [address] [place] [address]      The patient was admitted to the hospital at N/A on N/A for the following diagnosis:  Colitis [K52 9]  Abdominal pain [R10 9]       There is documentation in the medical record of an expected length of stay of at least 2 midnights  The patient is therefore expected to satisfy the 2 midnight benchmark and given the 2 midnight presumption is appropriate for INPATIENT ADMISSION  Given this expectation of a satisfying stay, CMS instructs us that the patient is most often appropriate for inpatient admission under part A provided medical necessity is documented in the chart  After review of the relevant documentation, labs, vital signs and test results, the patient is appropriate for INPATIENT ADMISSION  Admission to the hospital as an inpatient is a complex decision making process which requires the practitioner to consider the patients presenting complaint, history and physical examination and all relevant testing  With this in mind, in this case, the patient was deemed appropriate for INPATIENT ADMISSION  After review of the documentation and testing available at the time of the admission I concur with this clinical determination of medical necessity  Rationale is as follows: The patient is a 79 yrs old Female who presented to the ED at 10/25/2018  4:08 PM with a chief complaint of Abdominal Pain (c/o abd pain since last night with nausea and chills)    Given the need for further hospitalization, and along with the documentation of medical necessity present in the chart, the patient is appropriate for inpatient admission  The patient is expected to satisfy the 2 midnight benchmark, and will require further acute medical care  The patient does have comorbid conditions which increases the risk for significant adverse outcome   Given this the patient is appropriate for inpatient admission  The patients vitals on arrival were ED Triage Vitals [10/25/18 1603]   Temperature Pulse Respirations Blood Pressure SpO2   98 7 °F (37 1 °C) 68 16 128/60 99 %      Temp Source Heart Rate Source Patient Position - Orthostatic VS BP Location FiO2 (%)   Tympanic Monitor Sitting Right arm --      Pain Score       9           Past Medical History:   Diagnosis Date    Diverticulitis     Hypertension     Osteoporosis      Past Surgical History:   Procedure Laterality Date    TONSILLECTOMY             Consults have been placed to:   None    Vitals:    10/26/18 2107 10/27/18 0715 10/27/18 1416 10/27/18 2147   BP:  123/60 132/61 130/60   BP Location:  Right arm Right arm Right arm   Pulse: 86 80 75 72   Resp:  17 18 18   Temp:  98 6 °F (37 °C) 99 °F (37 2 °C) 98 6 °F (37 °C)   TempSrc:  Oral Oral Oral   SpO2: 93% 98% 94% 92%   Weight:       Height:           Most recent labs:    Recent Labs      10/25/18   1641   10/27/18   0631   WBC  14 30*   < >  14 91*   HGB  13 7   < >  11 2*   HCT  42 2   < >  34 3*   PLT  277   < >  191   K  3 5   < >  4 1   NA  135*   < >  135*   CALCIUM  9 5   < >  8 4   BUN  14   < >  10   CREATININE  0 81   < >  0 62   LIPASE  146   --    --    AST  19   --    --    ALT  35   --    --    ALKPHOS  70   --    --     < > = values in this interval not displayed         Scheduled Meds:  Current Facility-Administered Medications:  acetaminophen 650 mg Oral Q6H PRN Edward Escalante, CRNP    bisoprolol 10 mg Oral Daily Edward Escalante, CRNP    enoxaparin 40 mg Subcutaneous Daily Edward Escalante, CRNP    levofloxacin 750 mg Intravenous Q24H Edward Escalante, CRNP Last Rate: 750 mg (10/27/18 2054)   And        metroNIDAZOLE 500 mg Intravenous Q8H Edward Escalante, CRNP Last Rate: 500 mg (10/27/18 1803)   melatonin 3 mg Oral HS PRN Edward Angles, CRNP    multivitamin-minerals 1 tablet Oral Daily Edward Escalante, BAUDILIONP    sodium chloride 75 mL/hr Intravenous Continuous [facility] JOHNS FRANTZ Barragan Last Rate: 75 mL/hr (10/27/18 0829)   traMADol 50 mg Oral Q6H PRN Nancy Bermeo DO      Continuous Infusions:  sodium chloride 75 mL/hr Last Rate: 75 mL/hr (10/27/18 0829)     PRN Meds:   acetaminophen    melatonin    traMADol    Surgical procedures (if appropriate):

## 2018-10-28 NOTE — DISCHARGE INSTRUCTIONS
Colitis   WHAT YOU NEED TO KNOW:   Colitis is swelling and irritation of your colon  Colitis may be caused by ulcers or a problem with your immune system  Bacteria, a virus, or a parasite may also cause colitis  The cause may not be known  You may have diarrhea, abdominal pain, fever, or blood or mucus in your bowel movement  DISCHARGE INSTRUCTIONS:   Return to the emergency department if:   · You have sudden trouble breathing  · Your bowel movements are black or have blood in them  · You have blood in your vomit  · You have severe abdominal pain or your abdomen is swollen and feels hard  · You have any of the following signs of dehydration:     ¨ Dizziness or weakness    ¨ Dry mouth, cracked lips, or severe thirst    ¨ Fast heartbeat or breathing    ¨ Urinating very little or not at all  Contact your healthcare provider if:   · Your symptoms get worse or do not go away  · You have a fever, chills, cough, or feel weak and achy  · You suddenly lose weight without trying  · You have questions or concerns about your condition or care  Medicines:   · Medicines  may be given to decrease inflammation in your colon and treat diarrhea  · Take your medicine as directed  Contact your healthcare provider if you think your medicine is not helping or if you have side effects  Tell him of her if you are allergic to any medicine  Keep a list of the medicines, vitamins, and herbs you take  Include the amounts, and when and why you take them  Bring the list or the pill bottles to follow-up visits  Carry your medicine list with you in case of an emergency  Manage your symptoms:   · Drink liquids as directed  to help prevent dehydration  Good liquids to drink include water, juice, and broth  Ask how much liquid to drink each day  You may need to drink an oral rehydration solution (ORS)  An ORS contains a balance of water, salt, and sugar to replace body fluids lost during diarrhea       · Eat a variety of healthy foods  Healthy foods include fruits, vegetables, whole-grain breads, beans, low-fat dairy products, lean meats, and fish  You may need to eat several small meals throughout the day instead of large meals  Avoid spicy foods, caffeine, chocolate, and foods high in fat  · Talk to your healthcare provider before you take NSAIDs  NSAIDs can cause worsen your symptoms if ulcers are causing your colitis  · Start to exercise when you feel better  Regular exercise helps your bowels work normally  Ask about the best exercise plan for you  Follow up with your healthcare provider as directed: You may need to return for a colonoscopy or other tests  Write down how often you have a bowel movements and what they look like  Bring this to your follow-up visits  Write down your questions so you remember to ask them during your visits  © 2017 [employer] Information is for End User's use only and may not be sold, redistributed or otherwise used for commercial purposes  All illustrations and images included in CareNotes® are the copyrighted property of A [employer] M [employer]  or Jatinder Dinh  The above information is an  only  It is not intended as medical advice for individual conditions or treatments  Talk to your doctor, nurse or pharmacist before following any medical regimen to see if it is safe and effective for you        Follow up with your primary care doctor, GI doctor as soon as possible after discharge

## 2018-10-28 NOTE — PLAN OF CARE
DISCHARGE PLANNING     Discharge to home or other facility with appropriate resources Progressing        DISCHARGE PLANNING - CARE MANAGEMENT     Discharge to post-acute care or home with appropriate resources Progressing        GASTROINTESTINAL - ADULT     Minimal or absence of nausea and/or vomiting Progressing        INFECTION - ADULT     Absence or prevention of progression during hospitalization Progressing        PAIN - ADULT     Verbalizes/displays adequate comfort level or baseline comfort level Progressing        Potential for Falls     Patient will remain free of falls Progressing        SAFETY ADULT     Patient will remain free of falls Progressing

## 2018-10-28 NOTE — UTILIZATION REVIEW
Continued Stay Review    Thank you,  [address]  Utilization Review Department  Phone: [phone]; Fax [phone]  ATTENTION: Please call with any questions or concerns to [phone]  and carefully follow the prompts so that you are directed to the right person  Send all requests for admission clinical reviews, approved or denied determinations and any other requests to fax [phone]  All voicemails are confidential      Date: 10/28/2018    Patient status   Changed from Observation to Inpatient admission  On 10/27 @ 23:07  10/27/18 2307  Inpatient Admission     Transfer Service: General Medicine       Question Answer   Admitting Physician Jen Kellogg, [facility]   Level of Care Med Surg   Estimated length of stay More than 2 Midnights   Certification I certify that inpatient services are medically necessary for this patient for a duration of greater than two midnights  See H&P and MD Progress Notes for additional information about the patient's course of treatment                   Vital Signs: /66 (BP Location: Right arm)   Pulse 69   Temp 98 6 °F (37 °C) (Oral)   Resp 18   Ht 5' 3" (1 6 m)   Wt 69 2 kg (152 lb 8 9 oz)   SpO2 95%   BMI 27 02 kg/m²     Medications:   Scheduled Meds:   Current Facility-Administered Medications:  acetaminophen 650 mg Oral Q6H PRN    bisoprolol 10 mg Oral Daily    enoxaparin 40 mg Subcutaneous Daily    levofloxacin 750 mg Intravenous Q24H    And       metroNIDAZOLE 500 mg Intravenous Q8H    melatonin 3 mg Oral HS PRN    multivitamin-minerals 1 tablet Oral Daily    traMADol 50 mg Oral Q6H PRN      Continuous Infusions:   sodium chloride 75 mL/hr         Abnormal Labs/Diagnostic Results: 10/28 - Wbc /8 85 - H/H 11 1/34 4    Age/Sex: 79 y o  female     Assessment/Plan:  Segmental colitis ([address] )     · Recently hospitalized for diverticulitis with abscess in [place]; followed up with GI as an outpatient and had a colonoscopy as an outpatient about 10 days prior to admission where polyps were removed  CT of the abdomen and pelvis revealed long segment thickening of the sigmoid colon likely colitis with significant leukocytosis  · Patient was on IVF, IV Levaquin and Flagyl here and transitioned to p o  Antibiotics on discharge  · Tolerating diet  Patient advised to follow up with her PMD and gastroenterologist after discharge  · Patient declined GI input here unless her symptoms worsened or abdominal pain failed to improve      Hyponatremia-resolved as of 10/28/2018   Assessment & Plan     Mild hyponatremia  Sodium level stable       Essential hypertension     Continue Bisoprolol-HCTZ as she uses at home      Leukocytosis-resolved as of 10/28/2018     Likely due to segmental colitis    Leukocytosis resolved            Discharge Plan:  TBD

## 2018-10-29 NOTE — UTILIZATION REVIEW
Notification of Inpatient Admission/Inpatient Authorization Request  This is a Notification of Inpatient Admission/Request for Inpatient Authorization for our facility [address] [place] [address]  Please be advised that this patient is currently in our facility under Inpatient Status  Below you will find the Attending Physician and Facilitys information including NPI#  and contact information for the Utilization Review Department where the patient is receiving care services  Place of Service Code: 24   [facility]  Presentation Date & Time: 10/25/2018  4:08 PM  Inpatient Admission Date & Time: N/A N/A  Discharge Date & Time: 10/28/2018  1:39 PM   Discharge Disposition (if discharged): Home/Self Care  Attending Physician: SARAH Bone  Specialty- Hospitalist,   [facility] ID- [id number]  [address] [place] [address]  Phone 1: [phone]  Fax: [phone]  Admission Orders:   Admission Orders     Ordered        10/27/18 2306  Inpatient Admission  Once         10/25/18 2008  Place in Observation (expected length of stay for this patient is less than two midnights)  Once               Facility: [address] [place] [address]  Address: [address] [place] [address]  Phone: [phone]  Tax ID: [id number]  NPI: [id number]  Medicare ID: [id number]    Thank you,  [address] [place] [address] Utilization Review Department  Phone: [phone]; Fax [phone]  ATTENTION: Please call with any questions or concerns to [phone]  and carefully follow the prompts so that you are directed to the right person  Send all requests for admission clinical reviews, approved or denied determinations and any other requests to fax [phone]   All voicemails are confidential   Kathi Alexis RN Registered Nurse Signed   Utilization Review Date of Service: 10/26/2018  7:26 AM         []Hide copied text  Initial Clinical Review     Admission: Date/Time/Statement: 10/25/18 2009           Orders Placed This Encounter   Procedures    Place in Observation (expected length of stay for this patient is less than two midnights)       Standing Status:   Standing       Number of Occurrences:   1       Order Specific Question:   Admitting Physician       Answer:   Radha Osborne [[id number]]       Order Specific Question:   Level of Care       Answer:   Med Surg [16]            ED: Date/Time/Mode of Arrival:             ED Arrival Information      Expected Arrival Acuity Means of Arrival Escorted By Service Admission Type     - 10/25/2018 15:46 Urgent Walk-In Friend General Medicine Urgent     Arrival Complaint     SEVERE ABDOMINAL PAIN             Chief Complaint:        Chief Complaint   Patient presents with    Abdominal Pain       c/o abd pain since last night with nausea and chills         History of Illness: Nirmal Carter a 79 y o  female with a PMH of osteoarthritis, hypertension, diverticulitis who presents with lower abdominal pain   Patient was hospitalized several weeks ago in [place] for diverticulitis with abscess   She was treated with antibiotics and then had an outpatient colonoscopy about 10 days ago which showed resolution of the abscess   Several polyps were taken for biopsy  Alanis Crocker is appear visiting family and developed lower abdominal pain yesterday  Alanis Crocker has reported some intermittent constipation   In the ER she is found to have leukocytosis with a white count of 14 6   CT of the abdomen and pelvis revealed segmental colitis   Patient was given Dilaudid for pain medication and subsequently started vomiting   She is admitted for observation over night   She reports some chills but no fever   She denies any diarrhea   She did feel nauseous prior to her arrival in the emergency department         ED Vital Signs:            ED Triage Vitals [10/25/18 1603]   Temperature Pulse Respirations Blood Pressure SpO2   98 7 °F (37 1 °C) 68 16 128/60 99 %       Temp Source Heart Rate Source Patient Position - Orthostatic VS BP Location FiO2 (%)   Tympanic Monitor Sitting Right arm --       Pain Score           9                Wt Readings from Last 1 Encounters:   10/25/18 69 2 kg (152 lb 8 9 oz)         Abnormal Labs/Diagnostic Test Results: na 134 glucose 109 wbc 20 94, ua nitrite positive  Ct abdomen pelvis Long segment thickening of the sigmoid colon in keeping with a nonspecific segmental colitis, likely infectious or inflammatory   This does not have the typical appearance for diverticulitis given the long segment of involvement  No pericolonic abscess   No bowel obstruction  ED Treatment:              Medication Administration from 10/25/2018 1546 to 10/25/2018 2046        Date/Time Order Dose Route Action Action by Comments       10/25/2018 1854 sodium chloride 0 9 % bolus 1,000 mL 0 mL Intravenous Stopped Ashleigh Coronado RN         10/25/2018 1641 sodium chloride 0 9 % bolus 1,000 mL 1,000 mL Intravenous New [facility] Desmond Naik RN         10/25/2018 1706 morphine (PF) 4 mg/mL injection 4 mg 4 mg Intravenous Given Ashleigh Coronado RN         10/25/2018 1745 iohexol (OMNIPAQUE) 350 MG/ML injection (MULTI-DOSE) 100 mL 100 mL Intravenous Given Miriam Qiu         10/25/2018 1851 ciprofloxacin (CIPRO) tablet 500 mg 500 mg Oral Given Ashleigh Coronado RN         10/25/2018 1851 metroNIDAZOLE (FLAGYL) tablet 500 mg 500 mg Oral Given Ashleigh Coronado RN         10/25/2018 1852 HYDROmorphone (DILAUDID) injection 1 mg 1 mg Intravenous Given Ashleigh Coronado RN         10/25/2018 1959 ondansetron (ZOFRAN) injection 4 mg 4 mg Intravenous Given Randell Reynolds RN               Past Medical/Surgical History:         Active Ambulatory Problems     Diagnosis Date Noted    No Active Ambulatory Problems           Resolved Ambulatory Problems     Diagnosis Date Noted    No Resolved Ambulatory Problems           Past Medical History: Diagnosis Date    Diverticulitis      Hypertension      Osteoporosis           Admitting Diagnosis: Colitis [K52 9]  Abdominal pain [R10 9]     Age/Sex: 79 y o  female     Assessment/Plan:       Segmental colitis ([address] )   Assessment & Plan     · Recently hospitalized for diverticulitis with abscess in [place]; followed up with GI as an outpatient and had a colonoscopy as an outpatient about 10 days ago  · Last night patient developed lower abdominal cramps - no  vomiting, diarrhea, fevers;  some constipation and chills  · CT of the abdomen and pelvis revealed long segment thickening of the sigmoid colon likely colitis  · Labs significant for a WBC 14 30  · Admit patient for further management given nausea and vomiting out after pain medication administration  · NPO for bowel rest, IV hydration, continue Cipro and Flagyl, antiemetics, pain medication  · Patient prefers to follow up with her gastroenterologist at home in [place], will hold off on consulting GI right now   Hypertension   Assessment & Plan     · Continue blood pressure medications   Osteoporosis   Assessment & Plan     · Continue Fosamax    VTE Prophylaxis: Enoxaparin (Lovenox)  / sequential compression device   Code Status: No Order  Anticipated Length of Stay: Narinder [place] will be admitted on an Observation basis with an anticipated length of stay of  less than 2 midnights    Justification for Hospital Stay:  Segmental colitis     Admission Orders:  Observation  gmf  Npo      Scheduled Meds:   Current Facility-Administered Medications:  bisoprolol 10 mg Oral Daily FRANTZ Carrillo     enoxaparin 40 mg Subcutaneous Daily FRANTZ Carrillo     levofloxacin 750 mg Intravenous Q24H FRANTZ Carrillo     And             metroNIDAZOLE 500 mg Intravenous Q8H FRANTZ Carrillo Last Rate: 500 mg (10/26/18 9125)   melatonin 3 mg Oral HS PRN FRANTZ Carrillo     morphine injection 2 mg Intravenous Q3H PRN FRANTZ Carrillo   multivitamin-minerals 1 tablet Oral Daily Lawayne Antis, CRNP     sodium chloride 75 mL/hr Intravenous Continuous Lawayne Antis, CRNP Last Rate: 75 mL/hr (10/25/18 2118)      Continuous Infusions:   sodium chloride 75 mL/hr Last Rate: 75 mL/hr (10/25/18 2118)      PRN Meds: melatonin    morphine injection              Continued Stay Review    Thank you,  [address]  Utilization Review Department  Phone: [phone]; Fax [phone]  ATTENTION: Please call with any questions or concerns to [phone]  and carefully follow the prompts so that you are directed to the right person  Send all requests for admission clinical reviews, approved or denied determinations and any other requests to fax [phone]  All voicemails are confidential      Date:10/27/2018    Vital Signs: /66 (BP Location: Right arm)   Pulse 69   Temp 98 6 °F (37 °C) (Oral)   Resp 18   Ht 5' 3" (1 6 m)   Wt 69 2 kg (152 lb 8 9 oz)   SpO2 95%   BMI 27 02 kg/m²     Medications:   Scheduled Meds:   Current Facility-Administered Medications:  acetaminophen 650 mg Oral Q6H PRN 11/26 x 1    bisoprolol 10 mg Oral Daily    enoxaparin 40 mg Subcutaneous Daily    levofloxacin 750 mg Intravenous Q24H    And       metroNIDAZOLE 500 mg Intravenous Q8H    melatonin 3 mg Oral HS PRN 10/25 x 1  10/26 x 1      morphine injection 2 mg Intravenous Q3H PRN 10/26 x 5  10/27 x 1    multivitamin-minerals 1 tablet Oral Daily      Continuous Infusions:  NSS @ 75 ml/hr     Nursing Orders - VS q 4 - up & OOB as tolerated - SCD's to le's- Diet Clears     Abnormal Labs/Diagnostic Results: 10/27 - Na 135 - Wbc 14 91 - H/H 11 2/34 3    Age/Sex: 79 y o  female     Assessment/Plan: 1  Segmental colitis - continue with IV Levaquin and Flagyl for now, continue IVF  Patient on clear liquid diet  Patient still with leukocytosis  Repeat lab work in a m   Patient has her own gastroenterologist in [place] and will follow up with Him after discharge     2  Hyponatremia - likely hypovolemic hyponatremia  Repeat lab work in a m  And monitor       Discharge Plan:             Continued Stay Review    Thank you,  [address] Utilization Review Department  Phone: [phone]; Fax [phone]  ATTENTION: Please call with any questions or concerns to [phone]  and carefully follow the prompts so that you are directed to the right person  Send all requests for admission clinical reviews, approved or denied determinations and any other requests to fax [phone]  All voicemails are confidential      Date: 10/28/2018    Patient status   Changed from Observation to Inpatient admission  On 10/27 @ 23:07  10/27/18 2307  Inpatient Admission     Transfer Service: General Medicine       Question Answer   Admitting Physician Sabino Ortiz [facility]   Level of Care Med Surg   Estimated length of stay More than 2 Midnights   Certification I certify that inpatient services are medically necessary for this patient for a duration of greater than two midnights  See H&P and MD Progress Notes for additional information about the patient's course of treatment                   Vital Signs: /66 (BP Location: Right arm)   Pulse 69   Temp 98 6 °F (37 °C) (Oral)   Resp 18   Ht 5' 3" (1 6 m)   Wt 69 2 kg (152 lb 8 9 oz)   SpO2 95%   BMI 27 02 kg/m²     Medications:   Scheduled Meds:   Current Facility-Administered Medications:  acetaminophen 650 mg Oral Q6H PRN    bisoprolol 10 mg Oral Daily    enoxaparin 40 mg Subcutaneous Daily    levofloxacin 750 mg Intravenous Q24H    And       metroNIDAZOLE 500 mg Intravenous Q8H    melatonin 3 mg Oral HS PRN    multivitamin-minerals 1 tablet Oral Daily    traMADol 50 mg Oral Q6H PRN      Continuous Infusions:   sodium chloride 75 mL/hr         Abnormal Labs/Diagnostic Results: 10/28 - Wbc /8 85 - H/H 11 1/34 4    Age/Sex: 79 y o  female     Assessment/Plan:  Segmental colitis (HCC)     · Recently hospitalized for diverticulitis with abscess in [place]; followed up with GI as an outpatient and had a colonoscopy as an outpatient about 10 days prior to admission where polyps were removed  CT of the abdomen and pelvis revealed long segment thickening of the sigmoid colon likely colitis with significant leukocytosis  · Patient was on IVF, IV Levaquin and Flagyl here and transitioned to p o  Antibiotics on discharge  · Tolerating diet  Patient advised to follow up with her PMD and gastroenterologist after discharge  · Patient declined GI input here unless her symptoms worsened or abdominal pain failed to improve      Hyponatremia-resolved as of 10/28/2018   Assessment & Plan     Mild hyponatremia  Sodium level stable       Essential hypertension     Continue Bisoprolol-HCTZ as she uses at home      Leukocytosis-resolved as of 10/28/2018     Likely due to segmental colitis  Leukocytosis resolved            Discharge Plan:  TBD         Notification of Discharge  This is a Notification of Discharge from our facility [address]  Please be advised that this patient has been discharge from our facility  Below you will find the admission and discharge date and time including the patients disposition  PRESENTATION DATE: 10/25/2018  4:08 PM  IP ADMISSION DATE: 10/27/18 2306  DISCHARGE DATE: 10/28/2018  1:39 PM  DISPOSITION: Home/Self Care    [address] [facility] in the [facility] Utilization Review Department  Phone: [phone]; Fax [phone]  ATTENTION: The Network Utilization Review Department is now centralized for our 9 Facilities  Make a note that we have a new phone and fax numbers for our Department  Please call with any questions or concerns to [phone] and carefully follow the prompts so that you are directed to the right person   All voicemails are confidential  Fax any determinations, approvals, denials, and requests for initial or continue stay review clinical to [phone]  Due to HIGH CALL volume, it would be easier if you could please send faxed requests to expedite your requests and in part, help us provide discharge notifications faster

## 2018-10-31 NOTE — UTILIZATION REVIEW
Notification of Inpatient Admission/Inpatient Authorization Request  This is a Notification of Inpatient Admission/Request for Inpatient Authorization for our facility [facility]  Please be advised that this patient is currently in our facility under Inpatient Status  Below you will find the Attending Physician and Facilitys information including NPI#  and contact information for the Utilization Review Department where the patient is receiving care services  Place of Service Code: 24   [facility]  Presentation Date & Time: 10/25/2018  4:08 PM  Inpatient Admission Date & Time: N/A N/A  Discharge Date & Time: 10/28/2018  1:39 PM   Discharge Disposition (if discharged): Home/Self Care  Attending Physician: SARAH Townsend  Specialty- Hospitalist,   [facility] ID- [id number]  [address] [place] [address]  Phone 1: [phone]  Fax: [phone]  Admission Orders:   Admission Orders     Ordered        10/27/18 2306  Inpatient Admission  Once         10/25/18 2008  Place in Observation (expected length of stay for this patient is less than two midnights)  Once               Facility: [place] [address]  Address: [address] [place] [address]  Phone: [phone]  Tax ID: [id number]  NPI: [id number]  Medicare ID: [id number]    Thank you,  [address]  Utilization Review Department  Phone: [phone]; Fax [phone]  ATTENTION: Please call with any questions or concerns to [phone]  and carefully follow the prompts so that you are directed to the right person  Send all requests for admission clinical reviews, approved or denied determinations and any other requests to fax [phone]   All voicemails are confidential   Cristiana Archibald RN Registered Nurse Signed   Utilization Review Date of Service: 10/26/2018  7:26 AM         []Hide copied text  Initial Clinical Review     Admission: Date/Time/Statement: 10/25/18 2009           Orders Placed This Encounter   Procedures    Place in Observation (expected length of stay for this patient is less than two midnights)       Standing Status:   Standing       Number of Occurrences:   1       Order Specific Question:   Admitting Physician       Answer:   Renetta Mcelroy [[id number]]       Order Specific Question:   Level of Care       Answer:   Med Surg [16]            ED: Date/Time/Mode of Arrival:             ED Arrival Information      Expected Arrival Acuity Means of Arrival Escorted By Service Admission Type     - 10/25/2018 15:46 Urgent Walk-In Friend General Medicine Urgent     Arrival Complaint     SEVERE ABDOMINAL PAIN             Chief Complaint:        Chief Complaint   Patient presents with    Abdominal Pain       c/o abd pain since last night with nausea and chills         History of Illness: Briana Ozuna a 79 y o  female with a PMH of osteoarthritis, hypertension, diverticulitis who presents with lower abdominal pain   Patient was hospitalized several weeks ago in [place] for diverticulitis with abscess   She was treated with antibiotics and then had an outpatient colonoscopy about 10 days ago which showed resolution of the abscess   Several polyps were taken for biopsy  Norma Rios is appear visiting family and developed lower abdominal pain yesterday  Norma Rios has reported some intermittent constipation   In the ER she is found to have leukocytosis with a white count of 14 6   CT of the abdomen and pelvis revealed segmental colitis   Patient was given Dilaudid for pain medication and subsequently started vomiting   She is admitted for observation over night   She reports some chills but no fever   She denies any diarrhea   She did feel nauseous prior to her arrival in the emergency department         ED Vital Signs:            ED Triage Vitals [10/25/18 1603]   Temperature Pulse Respirations Blood Pressure SpO2   98 7 °F (37 1 °C) 68 16 128/60 99 %       Temp Source Heart Rate Source Patient Position - Orthostatic VS BP Location FiO2 (%)   Tympanic Monitor Sitting Right arm --       Pain Score           9                Wt Readings from Last 1 Encounters:   10/25/18 69 2 kg (152 lb 8 9 oz)         Abnormal Labs/Diagnostic Test Results: na 134 glucose 109 wbc 20 94, ua nitrite positive  Ct abdomen pelvis Long segment thickening of the sigmoid colon in keeping with a nonspecific segmental colitis, likely infectious or inflammatory   This does not have the typical appearance for diverticulitis given the long segment of involvement  No pericolonic abscess   No bowel obstruction  ED Treatment:              Medication Administration from 10/25/2018 1546 to 10/25/2018 2046        Date/Time Order Dose Route Action Action by Comments       10/25/2018 1854 sodium chloride 0 9 % bolus 1,000 mL 0 mL Intravenous Stopped Neri Miller RN         10/25/2018 1641 sodium chloride 0 9 % bolus 1,000 mL 1,000 mL Intravenous New 1555 [facility] Latoya Villarreal RN         10/25/2018 1706 morphine (PF) 4 mg/mL injection 4 mg 4 mg Intravenous Given Neri Miller RN         10/25/2018 1745 iohexol (OMNIPAQUE) 350 MG/ML injection (MULTI-DOSE) 100 mL 100 mL Intravenous Given [place]         10/25/2018 1851 ciprofloxacin (CIPRO) tablet 500 mg 500 mg Oral Given Neri Miller RN         10/25/2018 1851 metroNIDAZOLE (FLAGYL) tablet 500 mg 500 mg Oral Given Neri Miller RN         10/25/2018 1852 HYDROmorphone (DILAUDID) injection 1 mg 1 mg Intravenous Given Neri Miller RN         10/25/2018 1959 ondansetron (ZOFRAN) injection 4 mg 4 mg Intravenous Given Fabian Snyder RN               Past Medical/Surgical History:         Active Ambulatory Problems     Diagnosis Date Noted    No Active Ambulatory Problems           Resolved Ambulatory Problems     Diagnosis Date Noted    No Resolved Ambulatory Problems           Past Medical History: Diagnosis Date    Diverticulitis      Hypertension      Osteoporosis           Admitting Diagnosis: Colitis [K52 9]  Abdominal pain [R10 9]     Age/Sex: 79 y o  female     Assessment/Plan:       Segmental colitis ([address] )   Assessment & Plan     · Recently hospitalized for diverticulitis with abscess in [place]; followed up with GI as an outpatient and had a colonoscopy as an outpatient about 10 days ago  · Last night patient developed lower abdominal cramps - no  vomiting, diarrhea, fevers;  some constipation and chills  · CT of the abdomen and pelvis revealed long segment thickening of the sigmoid colon likely colitis  · Labs significant for a WBC 14 30  · Admit patient for further management given nausea and vomiting out after pain medication administration  · NPO for bowel rest, IV hydration, continue Cipro and Flagyl, antiemetics, pain medication  · Patient prefers to follow up with her gastroenterologist at home in [place], will hold off on consulting GI right now   Hypertension   Assessment & Plan     · Continue blood pressure medications   Osteoporosis   Assessment & Plan     · Continue Fosamax    VTE Prophylaxis: Enoxaparin (Lovenox)  / sequential compression device   Code Status: No Order  Anticipated Length of Stay: Ruthie Bell will be admitted on an Observation basis with an anticipated length of stay of  less than 2 midnights    Justification for Hospital Stay:  Segmental colitis     Admission Orders:  Observation  gmf  Npo      Scheduled Meds:   Current Facility-Administered Medications:  bisoprolol 10 mg Oral Daily FRANTZ Osorio     enoxaparin 40 mg Subcutaneous Daily FRANTZ Osorio     levofloxacin 750 mg Intravenous Q24H FRANTZ Osorio     And             metroNIDAZOLE 500 mg Intravenous Q8H FRANTZ Osorio Last Rate: 500 mg (10/26/18 0456)   melatonin 3 mg Oral HS PRN FRANTZ Osorio     morphine injection 2 mg Intravenous Q3H PRN FRANTZ Osorio   multivitamin-minerals 1 tablet Oral Daily Claritza Octave, CRNP     sodium chloride 75 mL/hr Intravenous Continuous Claritza Octave, CRNP Last Rate: 75 mL/hr (10/25/18 2118)      Continuous Infusions:   sodium chloride 75 mL/hr Last Rate: 75 mL/hr (10/25/18 2118)      PRN Meds: melatonin    morphine injection              Continued Stay Review    Thank you,  [address]  Utilization Review Department  Phone: [phone]; Fax [phone]  ATTENTION: Please call with any questions or concerns to [phone]  and carefully follow the prompts so that you are directed to the right person  Send all requests for admission clinical reviews, approved or denied determinations and any other requests to fax [phone]  All voicemails are confidential      Date:10/27/2018    Vital Signs: /66 (BP Location: Right arm)   Pulse 69   Temp 98 6 °F (37 °C) (Oral)   Resp 18   Ht 5' 3" (1 6 m)   Wt 69 2 kg (152 lb 8 9 oz)   SpO2 95%   BMI 27 02 kg/m²     Medications:   Scheduled Meds:   Current Facility-Administered Medications:  acetaminophen 650 mg Oral Q6H PRN 11/26 x 1    bisoprolol 10 mg Oral Daily    enoxaparin 40 mg Subcutaneous Daily    levofloxacin 750 mg Intravenous Q24H    And       metroNIDAZOLE 500 mg Intravenous Q8H    melatonin 3 mg Oral HS PRN 10/25 x 1  10/26 x 1      morphine injection 2 mg Intravenous Q3H PRN 10/26 x 5  10/27 x 1    multivitamin-minerals 1 tablet Oral Daily      Continuous Infusions:  NSS @ 75 ml/hr     Nursing Orders - VS q 4 - up & OOB as tolerated - SCD's to le's- Diet Clears     Abnormal Labs/Diagnostic Results: 10/27 - Na 135 - Wbc 14 91 - H/H 11 2/34 3    Age/Sex: 79 y o  female     Assessment/Plan: 1  Segmental colitis - continue with IV Levaquin and Flagyl for now, continue IVF  Patient on clear liquid diet  Patient still with leukocytosis  Repeat lab work in a m   Patient has her own gastroenterologist in [place] and will follow up with Him after discharge     2  Hyponatremia - likely hypovolemic hyponatremia  Repeat lab work in a m  And monitor       Discharge Plan:             Continued Stay Review    Thank you,  [address]  Utilization Review Department  Phone: [phone]; Fax [phone]  ATTENTION: Please call with any questions or concerns to [phone]  and carefully follow the prompts so that you are directed to the right person  Send all requests for admission clinical reviews, approved or denied determinations and any other requests to fax [phone]  All voicemails are confidential      Date: 10/28/2018    Patient status   Changed from Observation to Inpatient admission  On 10/27 @ 23:07  10/27/18 2307  Inpatient Admission     Transfer Service: General Medicine       Question Answer   Admitting Physician Maximilian Posey, [facility]   Level of Care Med Surg   Estimated length of stay More than 2 Midnights   Certification I certify that inpatient services are medically necessary for this patient for a duration of greater than two midnights  See H&P and MD Progress Notes for additional information about the patient's course of treatment                   Vital Signs: /66 (BP Location: Right arm)   Pulse 69   Temp 98 6 °F (37 °C) (Oral)   Resp 18   Ht 5' 3" (1 6 m)   Wt 69 2 kg (152 lb 8 9 oz)   SpO2 95%   BMI 27 02 kg/m²     Medications:   Scheduled Meds:   Current Facility-Administered Medications:  acetaminophen 650 mg Oral Q6H PRN    bisoprolol 10 mg Oral Daily    enoxaparin 40 mg Subcutaneous Daily    levofloxacin 750 mg Intravenous Q24H    And       metroNIDAZOLE 500 mg Intravenous Q8H    melatonin 3 mg Oral HS PRN    multivitamin-minerals 1 tablet Oral Daily    traMADol 50 mg Oral Q6H PRN      Continuous Infusions:   sodium chloride 75 mL/hr         Abnormal Labs/Diagnostic Results: 10/28 - Wbc /8 85 - H/H 11 1/34 4    Age/Sex: 79 y o  female     Assessment/Plan:  Segmental colitis (HCC)     · Recently hospitalized for diverticulitis with abscess in [place]; followed up with GI as an outpatient and had a colonoscopy as an outpatient about 10 days prior to admission where polyps were removed  CT of the abdomen and pelvis revealed long segment thickening of the sigmoid colon likely colitis with significant leukocytosis  · Patient was on IVF, IV Levaquin and Flagyl here and transitioned to p o  Antibiotics on discharge  · Tolerating diet  Patient advised to follow up with her PMD and gastroenterologist after discharge  · Patient declined GI input here unless her symptoms worsened or abdominal pain failed to improve      Hyponatremia-resolved as of 10/28/2018   Assessment & Plan     Mild hyponatremia  Sodium level stable       Essential hypertension     Continue Bisoprolol-HCTZ as she uses at home      Leukocytosis-resolved as of 10/28/2018     Likely due to segmental colitis    Leukocytosis resolved            Discharge Plan:  TBD

## 2018-11-01 NOTE — UTILIZATION REVIEW
Notification of Discharge  This is a Notification of Discharge from our facility [address]  Please be advised that this patient has been discharge from our facility  Below you will find the admission and discharge date and time including the patients disposition  PRESENTATION DATE: 10/25/2018  4:08 PM  IP ADMISSION DATE: 10/27/18 2306  DISCHARGE DATE: 10/28/2018  1:39 PM  DISPOSITION: Home/Self Care    [address] in the [facility] Utilization Review Department  Phone: [phone]; Fax [phone]  ATTENTION: The Network Utilization Review Department is now centralized for our 9 Facilities  Make a note that we have a new phone and fax numbers for our Department  Please call with any questions or concerns to [phone] and carefully follow the prompts so that you are directed to the right person  All voicemails are confidential  Fax any determinations, approvals, denials, and requests for initial or continue stay review clinical to [phone]  Due to HIGH CALL volume, it would be easier if you could please send faxed requests to expedite your requests and in part, help us provide discharge notifications faster

## 2018-11-08 NOTE — UTILIZATION REVIEW
Request for retro auth          Notification of Inpatient Admission/Inpatient Authorization Request  This is a Notification of Inpatient Admission/Request for Inpatient Authorization for our facility [facility]  Please be advised that this patient is currently in our facility under Inpatient Status  Below you will find the Attending Physician and Facilitys information including NPI#  and contact information for the Utilization Review Department where the patient is receiving care services  Place of Service Code: 24   [facility]  Presentation Date & Time: 10/25/2018  4:08 PM  Inpatient Admission Date & Time: N/A N/A  Discharge Date & Time: 10/28/2018  1:39 PM   Discharge Disposition (if discharged): Home/Self Care  Attending Physician: SARAH Bone  Specialty- Hospitalist,   [facility] ID- [id number]  [address] [place] [address]  Phone 1: [phone]  Fax: [phone]  Admission Orders:   Admission Orders     Ordered        10/27/18 2306  Inpatient Admission  Once         10/25/18 2008  Place in Observation (expected length of stay for this patient is less than two midnights)  Once               Facility: [place] [address]  Address: [address] [place] [address]  Phone: [phone]  Tax ID: [id number]  NPI: [id number]  Medicare ID: [id number]    Thank you,  [facility] Utilization Review Department  Phone: [phone]; Fax [phone]  ATTENTION: Please call with any questions or concerns to [phone]  and carefully follow the prompts so that you are directed to the right person  Send all requests for admission clinical reviews, approved or denied determinations and any other requests to fax [phone]   All voicemails are confidential   Kathi Alexis RN Registered Nurse Signed   Utilization Review Date of Service: 10/26/2018  7:26 AM         []Ángel copied text  Initial Clinical Review     Admission: Date/Time/Statement: 10/25/18 2009           Orders Placed This Encounter   Procedures    Place in Observation (expected length of stay for this patient is less than two midnights)       Standing Status:   Standing       Number of Occurrences:   1       Order Specific Question:   Admitting Physician       Answer:   Radha Osborne [[id number]]       Order Specific Question:   Level of Care       Answer:   Med Surg [16]            ED: Date/Time/Mode of Arrival:             ED Arrival Information      Expected Arrival Acuity Means of Arrival Escorted By Service Admission Type     - 10/25/2018 15:46 Urgent Walk-In Friend General Medicine Urgent     Arrival Complaint     SEVERE ABDOMINAL PAIN             Chief Complaint:        Chief Complaint   Patient presents with    Abdominal Pain       c/o abd pain since last night with nausea and chills         History of Illness: Nirmal Carter a 79 y o  female with a PMH of osteoarthritis, hypertension, diverticulitis who presents with lower abdominal pain   Patient was hospitalized several weeks ago in [place] for diverticulitis with abscess   She was treated with antibiotics and then had an outpatient colonoscopy about 10 days ago which showed resolution of the abscess   Several polyps were taken for biopsy  Alanis Crocker is appear visiting family and developed lower abdominal pain yesterday  Alanis Crocker has reported some intermittent constipation   In the ER she is found to have leukocytosis with a white count of 14 6   CT of the abdomen and pelvis revealed segmental colitis   Patient was given Dilaudid for pain medication and subsequently started vomiting   She is admitted for observation over night   She reports some chills but no fever   She denies any diarrhea   She did feel nauseous prior to her arrival in the emergency department         ED Vital Signs:            ED Triage Vitals [10/25/18 1603]   Temperature Pulse Respirations Blood Pressure SpO2   98 7 °F (37 1 °C) 68 16 128/60 99 %       Temp Source Heart Rate Source Patient Position - Orthostatic VS BP Location FiO2 (%)   Tympanic Monitor Sitting Right arm --       Pain Score           9                Wt Readings from Last 1 Encounters:   10/25/18 69 2 kg (152 lb 8 9 oz)         Abnormal Labs/Diagnostic Test Results: na 134 glucose 109 wbc 20 94, ua nitrite positive  Ct abdomen pelvis Long segment thickening of the sigmoid colon in keeping with a nonspecific segmental colitis, likely infectious or inflammatory   This does not have the typical appearance for diverticulitis given the long segment of involvement  No pericolonic abscess   No bowel obstruction  ED Treatment:              Medication Administration from 10/25/2018 1546 to 10/25/2018 2046        Date/Time Order Dose Route Action Action by Comments       10/25/2018 1854 sodium chloride 0 9 % bolus 1,000 mL 0 mL Intravenous Stopped Marina Martinez, RODY         10/25/2018 1641 sodium chloride 0 9 % bolus 1,000 mL 1,000 mL Intravenous New 1555 [facility] ADstruc, RODY         10/25/2018 1706 morphine (PF) 4 mg/mL injection 4 mg 4 mg Intravenous Given Venson Juan, RN         10/25/2018 1745 iohexol (OMNIPAQUE) 350 MG/ML injection (MULTI-DOSE) 100 mL 100 mL Intravenous Given Cheikh Carmona         10/25/2018 1851 ciprofloxacin (CIPRO) tablet 500 mg 500 mg Oral Given Venson Gums, RN         10/25/2018 1851 metroNIDAZOLE (FLAGYL) tablet 500 mg 500 mg Oral Given Venson Gums, RODY         10/25/2018 1852 HYDROmorphone (DILAUDID) injection 1 mg 1 mg Intravenous Given Venson Gums, RODY         10/25/2018 1959 ondansetron (ZOFRAN) injection 4 mg 4 mg Intravenous Given Tamara Mancera RN               Past Medical/Surgical History:         Active Ambulatory Problems     Diagnosis Date Noted    No Active Ambulatory Problems           Resolved Ambulatory Problems     Diagnosis Date Noted    No Resolved Ambulatory Problems     Past Medical History:   Diagnosis Date    Diverticulitis      Hypertension      Osteoporosis           Admitting Diagnosis: Colitis [K52 9]  Abdominal pain [R10 9]     Age/Sex: 79 y o  female     Assessment/Plan:       Segmental colitis ([address] )   Assessment & Plan     · Recently hospitalized for diverticulitis with abscess in [place]; followed up with GI as an outpatient and had a colonoscopy as an outpatient about 10 days ago  · Last night patient developed lower abdominal cramps - no  vomiting, diarrhea, fevers;  some constipation and chills  · CT of the abdomen and pelvis revealed long segment thickening of the sigmoid colon likely colitis  · Labs significant for a WBC 14 30  · Admit patient for further management given nausea and vomiting out after pain medication administration  · NPO for bowel rest, IV hydration, continue Cipro and Flagyl, antiemetics, pain medication  · Patient prefers to follow up with her gastroenterologist at home in [place], will hold off on consulting GI right now   Hypertension   Assessment & Plan     · Continue blood pressure medications   Osteoporosis   Assessment & Plan     · Continue Fosamax    VTE Prophylaxis: Enoxaparin (Lovenox)  / sequential compression device   Code Status: No Order  Anticipated Length of Stay: Tricia Collier will be admitted on an Observation basis with an anticipated length of stay of  less than 2 midnights    Justification for Hospital Stay:  Segmental colitis     Admission Orders:  Observation  gmf  Npo      Scheduled Meds:   Current Facility-Administered Medications:  bisoprolol 10 mg Oral Daily Cleavon [place], CRNP     enoxaparin 40 mg Subcutaneous Daily Cleavon [place], CRNP     levofloxacin 750 mg Intravenous Q24H Cleavon [place], CRNP     And             metroNIDAZOLE 500 mg Intravenous Q8H Cleavon [place], CRNP Last Rate: 500 mg (10/26/18 6241)   melatonin 3 mg Oral HS PRN Cleavon [place], CRNP     morphine injection 2 mg Intravenous Q3H PRN Lawayne Antis, CRNP     multivitamin-minerals 1 tablet Oral Daily Lawayne Antis, CRNP     sodium chloride 75 mL/hr Intravenous Continuous Lawayne Antis, CRNP Last Rate: 75 mL/hr (10/25/18 2118)      Continuous Infusions:   sodium chloride 75 mL/hr Last Rate: 75 mL/hr (10/25/18 2118)      PRN Meds: melatonin    morphine injection              Continued Stay Review    Thank you,  Ginny [address] Utilization Review Department  Phone: [phone]; Fax [phone]  ATTENTION: Please call with any questions or concerns to [phone]  and carefully follow the prompts so that you are directed to the right person  Send all requests for admission clinical reviews, approved or denied determinations and any other requests to fax [phone]  All voicemails are confidential      Date:10/27/2018    Vital Signs: /66 (BP Location: Right arm)   Pulse 69   Temp 98 6 °F (37 °C) (Oral)   Resp 18   Ht 5' 3" (1 6 m)   Wt 69 2 kg (152 lb 8 9 oz)   SpO2 95%   BMI 27 02 kg/m²     Medications:   Scheduled Meds:   Current Facility-Administered Medications:  acetaminophen 650 mg Oral Q6H PRN 11/26 x 1    bisoprolol 10 mg Oral Daily    enoxaparin 40 mg Subcutaneous Daily    levofloxacin 750 mg Intravenous Q24H    And       metroNIDAZOLE 500 mg Intravenous Q8H    melatonin 3 mg Oral HS PRN 10/25 x 1  10/26 x 1      morphine injection 2 mg Intravenous Q3H PRN 10/26 x 5  10/27 x 1    multivitamin-minerals 1 tablet Oral Daily      Continuous Infusions:  NSS @ 75 ml/hr     Nursing Orders - VS q 4 - up & OOB as tolerated - SCD's to le's- Diet Clears     Abnormal Labs/Diagnostic Results: 10/27 - Na 135 - Wbc 14 91 - H/H 11 2/34 3    Age/Sex: 79 y o  female     Assessment/Plan: 1  Segmental colitis - continue with IV Levaquin and Flagyl for now, continue IVF  Patient on clear liquid diet  Patient still with leukocytosis  Repeat lab work in a m   Patient has her own gastroenterologist in [place] and will follow up with Him after discharge     2  Hyponatremia - likely hypovolemic hyponatremia  Repeat lab work in a m  And monitor       Discharge Plan:             Continued Stay Review    Thank you,  Ginny Clarostonny  Utilization Review Department  Phone: [phone]; Fax [phone]  ATTENTION: Please call with any questions or concerns to [phone]  and carefully follow the prompts so that you are directed to the right person  Send all requests for admission clinical reviews, approved or denied determinations and any other requests to fax [phone]  All voicemails are confidential      Date: 10/28/2018    Patient status   Changed from Observation to Inpatient admission  On 10/27 @ 23:07  10/27/18 2307  Inpatient Admission     Transfer Service: General Medicine       Question Answer   Admitting Physician Lora Hair, [facility]   Level of Care Med Surg   Estimated length of stay More than 2 Midnights   Certification I certify that inpatient services are medically necessary for this patient for a duration of greater than two midnights  See H&P and MD Progress Notes for additional information about the patient's course of treatment                   Vital Signs: /66 (BP Location: Right arm)   Pulse 69   Temp 98 6 °F (37 °C) (Oral)   Resp 18   Ht 5' 3" (1 6 m)   Wt 69 2 kg (152 lb 8 9 oz)   SpO2 95%   BMI 27 02 kg/m²     Medications:   Scheduled Meds:   Current Facility-Administered Medications:  acetaminophen 650 mg Oral Q6H PRN    bisoprolol 10 mg Oral Daily    enoxaparin 40 mg Subcutaneous Daily    levofloxacin 750 mg Intravenous Q24H    And       metroNIDAZOLE 500 mg Intravenous Q8H    melatonin 3 mg Oral HS PRN    multivitamin-minerals 1 tablet Oral Daily    traMADol 50 mg Oral Q6H PRN      Continuous Infusions:   sodium chloride 75 mL/hr         Abnormal Labs/Diagnostic Results: 10/28 - Wbc /8 85 - H/H 11 1/34 4    Age/Sex: 79 y o  female     Assessment/Plan:  Segmental colitis ([facility] [address] )     · Recently hospitalized for diverticulitis with abscess in [place]; followed up with GI as an outpatient and had a colonoscopy as an outpatient about 10 days prior to admission where polyps were removed  CT of the abdomen and pelvis revealed long segment thickening of the sigmoid colon likely colitis with significant leukocytosis  · Patient was on IVF, IV Levaquin and Flagyl here and transitioned to p o  Antibiotics on discharge  · Tolerating diet  Patient advised to follow up with her PMD and gastroenterologist after discharge  · Patient declined GI input here unless her symptoms worsened or abdominal pain failed to improve      Hyponatremia-resolved as of 10/28/2018   Assessment & Plan     Mild hyponatremia  Sodium level stable       Essential hypertension     Continue Bisoprolol-HCTZ as she uses at home      Leukocytosis-resolved as of 10/28/2018     Likely due to segmental colitis  Leukocytosis resolved            Discharge Plan:  TBD               Notification of Discharge  This is a Notification of Discharge from our facility [address]  Please be advised that this patient has been discharge from our facility  Below you will find the admission and discharge date and time including the patients disposition  PRESENTATION DATE: 10/25/2018  4:08 PM  IP ADMISSION DATE: 10/27/18 2306  DISCHARGE DATE: 10/28/2018  1:39 PM  DISPOSITION: Home/Self Care    [address] [facility] in the [facility] Utilization Review Department  Phone: [phone]; Fax [phone]  ATTENTION: The Network Utilization Review Department is now centralized for our 9 Facilities  Make a note that we have a new phone and fax numbers for our Department  Please call with any questions or concerns to [phone] and carefully follow the prompts so that you are directed to the right person   All voicemails are confidential  Fax any determinations, approvals, denials, and requests for initial or continue stay review clinical to [phone]  Due to HIGH CALL volume, it would be easier if you could please send faxed requests to expedite your requests and in part, help us provide discharge notifications faster